# Patient Record
Sex: FEMALE | Race: BLACK OR AFRICAN AMERICAN | NOT HISPANIC OR LATINO | URBAN - METROPOLITAN AREA
[De-identification: names, ages, dates, MRNs, and addresses within clinical notes are randomized per-mention and may not be internally consistent; named-entity substitution may affect disease eponyms.]

---

## 2021-06-24 LAB — RUBV IGG SERPL IA-ACNC: NORMAL

## 2022-11-28 LAB
C TRACH RRNA SPEC DONR QL NAA+PROBE: NORMAL
EXTERNAL ABO GROUPING: NORMAL
EXTERNAL HEMATOCRIT: 35 %
EXTERNAL HEMOGLOBIN: 11.9 G/DL
EXTERNAL HEPATITIS B SURFACE ANTIGEN: NEGATIVE
EXTERNAL PLATELET COUNT: 294 K/ΜL
EXTERNAL RH FACTOR: POSITIVE
EXTERNAL SYPHILIS RPR SCREEN: NORMAL
HCV AB S/CO SERPL IA: NEGATIVE
HEMOGLOBIN FRACTIONATION: NORMAL
N GONORRHOEA DNA SPEC QL NAA+PROBE: NORMAL
VZV IGG SER QL: NORMAL

## 2022-12-13 ENCOUNTER — INITIAL PRENATAL (OUTPATIENT)
Dept: OBSTETRICS AND GYNECOLOGY | Facility: CLINIC | Age: 32
End: 2022-12-13

## 2022-12-13 VITALS — DIASTOLIC BLOOD PRESSURE: 90 MMHG | WEIGHT: 177.6 LBS | SYSTOLIC BLOOD PRESSURE: 135 MMHG

## 2022-12-13 DIAGNOSIS — Z13.0 SCREENING FOR SICKLE-CELL DISEASE OR TRAIT: ICD-10-CM

## 2022-12-13 DIAGNOSIS — O09.291 HISTORY OF STILLBIRTH IN CURRENTLY PREGNANT PATIENT, FIRST TRIMESTER: ICD-10-CM

## 2022-12-13 DIAGNOSIS — N91.2 ABSENT MENSES: Primary | ICD-10-CM

## 2022-12-13 DIAGNOSIS — Z11.3 SCREEN FOR STD (SEXUALLY TRANSMITTED DISEASE): ICD-10-CM

## 2022-12-13 DIAGNOSIS — O34.219 PREVIOUS CESAREAN DELIVERY AFFECTING PREGNANCY, ANTEPARTUM: ICD-10-CM

## 2022-12-13 DIAGNOSIS — O99.331 MATERNAL TOBACCO USE IN FIRST TRIMESTER: ICD-10-CM

## 2022-12-13 DIAGNOSIS — O09.211 HISTORY OF PRETERM LABOR, CURRENT PREGNANCY, FIRST TRIMESTER: ICD-10-CM

## 2022-12-13 DIAGNOSIS — Z13.71 SCREENING FOR GENETIC DISEASE CARRIER STATUS: ICD-10-CM

## 2022-12-13 LAB
B-HCG UR QL: POSITIVE
EXPIRATION DATE: ABNORMAL
GLUCOSE UR STRIP-MCNC: NEGATIVE MG/DL
INTERNAL NEGATIVE CONTROL: ABNORMAL
INTERNAL POSITIVE CONTROL: ABNORMAL
Lab: ABNORMAL
PROT UR STRIP-MCNC: NEGATIVE MG/DL

## 2022-12-13 PROCEDURE — 99204 OFFICE O/P NEW MOD 45 MIN: CPT | Performed by: OBSTETRICS & GYNECOLOGY

## 2022-12-13 PROCEDURE — 99406 BEHAV CHNG SMOKING 3-10 MIN: CPT | Performed by: OBSTETRICS & GYNECOLOGY

## 2022-12-13 PROCEDURE — 81025 URINE PREGNANCY TEST: CPT | Performed by: OBSTETRICS & GYNECOLOGY

## 2022-12-13 RX ORDER — PRENATAL VIT/IRON FUM/FOLIC AC 27MG-0.8MG
1 TABLET ORAL DAILY
Qty: 30 TABLET | Refills: 11 | Status: SHIPPED | OUTPATIENT
Start: 2022-12-13 | End: 2023-02-01 | Stop reason: SDUPTHER

## 2022-12-13 RX ORDER — PRENATAL VIT/IRON FUM/FOLIC AC 27MG-0.8MG
1 TABLET ORAL DAILY
COMMUNITY
Start: 2022-11-28 | End: 2022-12-13 | Stop reason: SDUPTHER

## 2022-12-13 RX ORDER — AMOXICILLIN 250 MG
1 CAPSULE ORAL DAILY
COMMUNITY

## 2022-12-13 NOTE — PROGRESS NOTES
Chief complaint: Absent menses  History of present illness: Patient presents for absent menses.  She reports a certain last menstrual period of 10/24/2022.  She reports that she has had initial prenatal visit at [facility].  She reports ultrasound was done there last week showing a live single intrauterine pregnancy at 6 weeks gestation, given a due date of 2023.  She also reports that she had a Pap smear they are within the last year.  She reports that initial prenatal panel and gonorrhea chlamydia cultures were also performed recently at [facility].  She does not have the results with her today.  Patient reports some mild episode of spotting about 2 weeks ago.  No bleeding since.  She reports mild uterine/pelvic cramping.  She reports resolution of her nausea and vomiting.  She does report some breast tenderness.  Patient has complicated past obstetrical history.  Patient reports that she had no prenatal care in her first pregnancy.  She stated that she had not realized that she was pregnant at the time.  She started having UTI pains and had not had a period for about 3 months.  She went to the hospital and was told she was actually 8 months pregnant and was in labor.  She says she went to the hospital in [place], [place].  She says that she was told that she needed an emergency .  When she woke up, she states that the baby had passed away.  She states an autopsy was not performed, or she is unaware of the results of 1.  She denies any known obvious chromosomal issues with that pregnancy.  This was in 2016.  Patient had another pregnancy last year.  She had 8-week spontaneous  which did not require dilation and curettage.  She was followed by [facility] for that pregnancy.    Past medical history: Significant only for stillbirth    Past surgical history:  section    Family history: Noncontributory for obstetrical complications    Social history: Current everyday smoker,  smokes 2-5 cigarettes/day.  Denies alcohol or drug abuse.    Current medications: Prenatal vitamins, Colace    Allergies: No known drug allergies    ROS:  Constitutional: No fevers, chills, sweats   Eye: No recent visual problems, denies blurry vision   HEENT: No ear pain, nasal congestion, sore throat, voice changes   Respiratory: No shortness of breath, cough, pain on breathing   Cardiovascular: No Chest pain, palpitations   Gastrointestinal: No nausea, vomiting, diarrhea, constipation   Genitourinary: No hematuria, dysuria, lesions on genitalia   Hema/Lymph: Negative for bruising, no edema   Endocrine: Negative for excessive thirst, excessive hunger, heat or cold intolerance   Musculoskeletal: No joint pain, muscle pain, decreased range of motion   Integumentary: No rash, pruritus, abrasions, lesions   Neurologic: No weakness, numbness, headaches   Psychiatric: No anxiety, depression, mood changes     Objective:  Physical exam: General: No acute distress, awake and x3  HEENT: Normocephalic, atraumatic, moist mucous membranes  Eyes: Pupils equal round react light accommodation, extraocular muscles intact  Lungs: Normal respiratory effort, normal work of breathing, good air movement  Abdomen: Soft, nontender, nondistended  Pelvic exam: Deferred today.  Pap smear not due and recent cultures already performed.    Assessment:  1.  32-year-old  3 para 0-1-1-0 at 7 weeks and 1 day by last menstrual period consistent with a 6-week ultrasound  2.  History of prior  birth  3.  History of prior stillbirth  4.  History of prior  section    Plan:  1.  Initial prenatal counseling performed today. Educational handouts on prenatal care provided to patient. Discussed frequency of visits, lab testing, OTC medications, physical activity, exercise, dietary restrictions, potential exposures (COVID, Zika, Toxo, etc). Hospital and call coverage system discussed. Pt is recommended to continue PNV.   Obtain US next  available for dating.  Patient has already had prenatal labs performed and a recent Pap smear done.  We will request records from [facility].  Has never had screening for genetic abnormalities.  We will perform CF/SMA/Fragile X testing today.  2. The risk of tobacco use during pregnancy was discussed with the patient.  The risks of adverse events for both the patient and for her offspring were discussed.  These include but are not limited to fetal growth restriction, placental abruption,  labor, hypertensive disorders of pregnancy such as preeclampsia or gestational hypertension, venous thromboembolism/deep vein thrombosis, stroke, heart attack, cancer, death.  Full cessation from tobacco products was encouraged. Greater than 3 mins was spent on tobacco cessation efforts.    3.  Discussed previous pregnancy.  Obviously will need more information from her hospitalization in [place] in 2016.  We will request the records.  Will need op report from this to know more about route of delivery for this pregnancy.  If patient is a candidate for trial of labor, she seems that she would be interested in a trial of labor after  section.  Need to make sure that for  section was not classical approach.  4.  Recommend return to office in 4 weeks.  Cell free DNA testing at the next visit.    I spent 45 minutes today on the care of this patient, including review of the chart and any pertinent prior applicable imaging and labs, interview/exam of the patient, developing care plan, and patient counseling.

## 2022-12-14 LAB
HGB A MFR BLD ELPH: 97.6 % (ref 96.4–98.8)
HGB A2 MFR BLD ELPH: 2.4 % (ref 1.8–3.2)
HGB F MFR BLD ELPH: 0 % (ref 0–2)
HGB FRACT BLD-IMP: NORMAL
HGB S MFR BLD ELPH: 0 %
HIV 1+2 AB+HIV1 P24 AG SERPL QL IA: NON REACTIVE

## 2022-12-20 LAB
CFTR MUT ANL BLD/T: NORMAL
FMR1 GENE CGG RPT BLD/T QL: NORMAL
LABORATORY COMMENT REPORT: NORMAL
LABORATORY COMMENT REPORT: NORMAL

## 2022-12-21 LAB
CLINICAL GENETICS COUNSELING NOTE: ABNORMAL
CLINICAL INFO: ABNORMAL
ETHNIC BACKGROUND STATED: ABNORMAL
LAB DIRECTOR NAME PROVIDER: ABNORMAL
LABORATORY COMMENT REPORT: ABNORMAL
REASON FOR REFERRAL (NARRATIVE): ABNORMAL
REF LAB TEST METHOD: ABNORMAL
SMN1 GENE MUT ANL BLD/T: ABNORMAL
SPECIMEN SOURCE: ABNORMAL
TEST PERFORMANCE INFO SPEC: ABNORMAL
TEST PERFORMANCE INFO SPEC: ABNORMAL

## 2022-12-22 ENCOUNTER — TELEPHONE (OUTPATIENT)
Dept: OBSTETRICS AND GYNECOLOGY | Facility: CLINIC | Age: 32
End: 2022-12-22

## 2022-12-22 DIAGNOSIS — Z15.89 BIALLELIC MUTATION OF SMN1 GENE: Primary | ICD-10-CM

## 2022-12-22 NOTE — TELEPHONE ENCOUNTER
----- Message from Kayce Weaver RN sent at 12/22/2022  9:07 AM EST -----  Regarding: Lab results   Contact: [phone]  My Chart. 8w3d. OB 1/10/2023. Concern over lab result genetic carrier. I did not see where you have been able to review her testing yet. Thank you.      ----- Message -----  From: Rhonda Wells  Sent: 12/22/2022   8:57 AM EST  To: [facility]  Subject: Lab results                                      Good morning,   I reviewed my most recent lab result and it stated that I am a silent carrier for a genetic issue. I believe it was called spinal muscular atrophy. I read it and now I’m confused. Is this something I should be seriously worried about? I wanted to follow up with you regarding that.     I look forward to hearing from you.   Have a wonderful day!

## 2022-12-22 NOTE — TELEPHONE ENCOUNTER
Spoke with patient at length.  Explained findings of SMA silent carrier.  Discussed recommendations of testing father the baby.  Would recommend genetic counseling.  Genetic counseling referral ordered.  Patient would like to come into office with the father of the baby to discuss and have him tested.  She was added to the schedule next week on 12/27.

## 2022-12-27 ENCOUNTER — ROUTINE PRENATAL (OUTPATIENT)
Dept: OBSTETRICS AND GYNECOLOGY | Facility: CLINIC | Age: 32
End: 2022-12-27

## 2022-12-27 VITALS — SYSTOLIC BLOOD PRESSURE: 129 MMHG | WEIGHT: 179.6 LBS | DIASTOLIC BLOOD PRESSURE: 89 MMHG

## 2022-12-27 DIAGNOSIS — Z14.8 CARRIER OF SPINAL MUSCULAR ATROPHY: Primary | ICD-10-CM

## 2022-12-27 LAB
GLUCOSE UR STRIP-MCNC: NEGATIVE MG/DL
PROT UR STRIP-MCNC: NEGATIVE MG/DL

## 2022-12-27 PROCEDURE — 99213 OFFICE O/P EST LOW 20 MIN: CPT | Performed by: OBSTETRICS & GYNECOLOGY

## 2022-12-27 NOTE — PROGRESS NOTES
Chief complaint: Follow-up abnormal SMN1 results  HPI: Patient here to discuss recent abnormal lab results.  Patient screened as a carrier for SMA.  She is without complaints at this time.  She is accompanied by her partner.  She desires to have her partner screened for SMA.  Of note, we still have not received outside prenatal records from [facility] and we have not received her delivery records from [facility].    Objective: See vital signs in the flowsheet  General: No acute distress, awake alert x3    Labs: 2 copis SMN1 - silent carrier; CF negative; Fragile X negative  Sickle cell screen negative  HIV negative  Assessment:  1.  32-year-old  3 para 0-1-1-0 at 9-1/7 weeks gestational age  2.  Silent carrier for SMA  3.  History of IUFD/ birth in G1  4.  History of prior   5.  Suspected undiagnosed chronic hypertension    Plan:  1.  Discussed significance of SMA carrier state at length.  Patient has been referred for genetic counseling.  Patient's partner here today and he wishes to be tested to see if he is a carrier for SMA.  Partner's name is Graham Duarte.  2.  Otherwise doing well.  Plan to return as previously scheduled in 2 weeks.  Cell free DNA testing at that visit.  If we have still not obtained her previously collected prenatal labs from [facility], I would repeat her OB profile at that time.  3. I spent 20 minutes today on the care of this patient, including review of the chart and any pertinent prior imaging and labs, interview/exam of the patient, developing care plan, and counseling the patient on management options.

## 2023-01-10 ENCOUNTER — ROUTINE PRENATAL (OUTPATIENT)
Dept: OBSTETRICS AND GYNECOLOGY | Facility: CLINIC | Age: 33
End: 2023-01-10
Payer: COMMERCIAL

## 2023-01-10 VITALS — WEIGHT: 180 LBS | DIASTOLIC BLOOD PRESSURE: 79 MMHG | SYSTOLIC BLOOD PRESSURE: 128 MMHG

## 2023-01-10 DIAGNOSIS — Z11.3 SCREEN FOR SEXUALLY TRANSMITTED DISEASES: ICD-10-CM

## 2023-01-10 DIAGNOSIS — O09.899 SUPERVISION OF OTHER HIGH RISK PREGNANCIES, UNSPECIFIED TRIMESTER: ICD-10-CM

## 2023-01-10 DIAGNOSIS — Z36.0 ENCOUNTER FOR ANTENATAL SCREENING FOR CHROMOSOMAL ANOMALIES: ICD-10-CM

## 2023-01-10 DIAGNOSIS — O34.219 PREVIOUS CESAREAN DELIVERY AFFECTING PREGNANCY, ANTEPARTUM: ICD-10-CM

## 2023-01-10 DIAGNOSIS — O09.211 HISTORY OF PRETERM LABOR, CURRENT PREGNANCY, FIRST TRIMESTER: Primary | ICD-10-CM

## 2023-01-10 LAB
GLUCOSE UR STRIP-MCNC: NEGATIVE MG/DL
PROT UR STRIP-MCNC: NEGATIVE MG/DL

## 2023-01-10 PROCEDURE — 99214 OFFICE O/P EST MOD 30 MIN: CPT | Performed by: OBSTETRICS & GYNECOLOGY

## 2023-01-10 NOTE — PROGRESS NOTES
Chief complaint: Prenatal visit  History of present illness: Patient is here for routine prenatal visit.  She has no major complaints today.  She reports some occasional mild cramping at times.  No bleeding.    At the time of her last visit, her partner was screened for SMA and carrier status as the patient is an SMA carrier.  His screen was negative.  Objective: See vital signs in the flowsheet  Fetal heart tones: 170  Assessment:  1.  32-year-old  3 para 0-1-1-0 at 11-1/7 weeks gestational age  2.  History of  delivery  3.  History of IUFD  4.  History of previous , unknown scar  5.  SM N1 carrier    I have obtained the patient's prior prenatal records from [facility].  I have reviewed these records at length.  The only thing significant was positive red blood cell antibody which was not identified.  We would repeat today.  Blood type is O+    Plan:  1.  Counseled regarding chromosomal screening.  Risk, benefits, alternatives, and side effects discussed.  She opts for NIPT today.  2.  We will need to obtain delivery records from her last  to better know how to  patient regarding options of a trial of labor versus repeat .  We will send another request to the [facility] in [place] to obtain these.  3.  Return to the office in 4 weeks.  I spent 30 minutes today on the care of this patient, including review of the chart and any pertinent prior imaging and labs, interview/exam of the patient, developing care plan, and counseling the patient on management options.

## 2023-01-11 LAB — BLD GP AB SCN SERPL QL: NEGATIVE

## 2023-01-14 LAB
CFDNA.FET/CFDNA.TOTAL SFR FETUS: NORMAL %
CITATION REF LAB TEST: NORMAL
FET 13+18+21+X+Y ANEUP PLAS.CFDNA: NEGATIVE
FET CHR 21 TS PLAS.CFDNA QL: NEGATIVE
FET SEX PLAS.CFDNA DOSAGE CFDNA: NORMAL
FET TS 13 RISK PLAS.CFDNA QL: NEGATIVE
FET TS 18 RISK WBC.DNA+CFDNA QL: NEGATIVE
GA EST FROM CONCEPTION DATE: NORMAL D
GESTATIONAL AGE > 9:: YES
LAB DIRECTOR NAME PROVIDER: NORMAL
LAB DIRECTOR NAME PROVIDER: NORMAL
LABORATORY COMMENT REPORT: NORMAL
LIMITATIONS OF THE TEST: NORMAL
NEGATIVE PREDICTIVE VALUE: NORMAL
NOTE: NORMAL
PERFORMANCE CHARACTERISTICS: NORMAL
POSITIVE PREDICTIVE VALUE: NORMAL
REF LAB TEST METHOD: NORMAL
TEST PERFORMANCE INFO SPEC: NORMAL

## 2023-01-16 ENCOUNTER — TELEPHONE (OUTPATIENT)
Dept: OBSTETRICS AND GYNECOLOGY | Facility: CLINIC | Age: 33
End: 2023-01-16
Payer: COMMERCIAL

## 2023-01-16 NOTE — TELEPHONE ENCOUNTER
Provider: CHUY GOEL MD  Caller: AUGIE MCGUIRE  Relationship to Patient: SELF  Phone Number: [phone]  Reason for Call: RESULTS FOR [id number] TEST.    PT RECEIVED RESULTS VIA PHONE AND IS REQUESTING THAT RESULTS BE RELEASED ON [employer] SO THAT SHE CAN PRINT IT.

## 2023-01-16 NOTE — TELEPHONE ENCOUNTER
Patient notified of [id number] results-normal per Dr. Vance. Patient did want to know sex of baby and told was a girl. Verified x2 with patient that she did want gender results.

## 2023-01-30 ENCOUNTER — TELEPHONE (OUTPATIENT)
Dept: GENETICS | Facility: HOSPITAL | Age: 33
End: 2023-01-30
Payer: COMMERCIAL

## 2023-01-31 ENCOUNTER — CLINICAL SUPPORT (OUTPATIENT)
Dept: GENETICS | Facility: HOSPITAL | Age: 33
End: 2023-01-31
Payer: COMMERCIAL

## 2023-01-31 ENCOUNTER — TELEPHONE (OUTPATIENT)
Dept: OBSTETRICS AND GYNECOLOGY | Facility: CLINIC | Age: 33
End: 2023-01-31
Payer: COMMERCIAL

## 2023-01-31 DIAGNOSIS — Z14.8 CARRIER OF SPINAL MUSCULAR ATROPHY: ICD-10-CM

## 2023-01-31 DIAGNOSIS — Z13.79 GENETIC TESTING: Primary | ICD-10-CM

## 2023-01-31 DIAGNOSIS — O09.291 HISTORY OF STILLBIRTH IN CURRENTLY PREGNANT PATIENT, FIRST TRIMESTER: ICD-10-CM

## 2023-01-31 NOTE — PROGRESS NOTES
Rhonda Wells is a 32-year-old  female seen for genetic counseling to discuss her spinal muscular atrophy (SMA) carrier status. Genetic counseling was performed via telephone. Ms. Wells confirmed her full name, date of birth, and that she was physically located in the [facility] at the time of the appointment. Ms. Wells was interested in discussing her carrier screening results and the odds of having a child affected with SMA.     FAMILY HISTORY:  We reviewed Ms. Wells and her partner’s family histories in detail. Ms. Wells is currently pregnant. She reports two prior miscarriages. Ms. Wells reports her partner is a carrier for sickle cell trait and has an autoimmune disease related to HIV. Ms. Wells reports her maternal uncle has diabetes. Their family histories are otherwise negative for known genetic conditions, multiple miscarriages, or birth defects.      GENETIC COUNSELING: We began by reviewing the 3-5% background risk to have a baby with a birth defect and carrier screening is available for over 550 recessive and X-linked conditions. Individuals who are carriers for these disorders typically do not have a family history of these disorders, as the majority of the conditions on the panel are inherited in an autosomal recessive pattern; other conditions on the panel are inherited in an X-linked manner. Most people are carriers for several genetic diseases, but these carriers often do not exhibit any symptoms of the disease. If a couple both carry the same disease, there is a 1 in 4 (25%) chance that they will have a child affected with the disease. The diseases on the panel range in severity from mild to severe.    Given Ms. Wells’s carrier status for SMA, we reviewed SMA. SMA is characterized by progressive muscle weakness, resulting in complications such as poor weight gain, sleep difficulties, pneumonia, scoliosis, and joint contractures. Onset of this condition is typically prior to the  age of 6 months, and children typically have a lifespan of less than two years. SMA is inherited in an autosomal recessive manner, which means that if both parents are SMA carriers there is a 25% (1 in 4) chance of having a child with the condition. The carrier frequency for SMA in the  population is 1 in 35 to 1 in 50. Since Ms. Wells is a known carrier of SMA, testing of the father of the baby would be most informative to determine the chance of having a child with SMA. Of note, Ms. Wells has had carrier screening for Fragile X, cystic fibrosis (CF), and a hemoglobinopathy fractionation cascade that were negative.      GENETIC TESTING:  The risks, benefits and limitations of genetic testing were reviewed. Genetic testing can have significant psychological implications for both individuals and families. Also discussed was the possibility of employment and insurance discrimination based on genetic test results and the federal and states laws that are in place to prevent this (KELLY), as well as limitations of these laws.     TEST RESULTS:  Carrier screening identified Ms. Wells to be positive for the c.*3+80T>G SNP, indicating a high risk that she is a carrier for SMA despite having two copies of SMN1. Her risk of being a silent carrier for SMA is estimated to be 1/34 based on the presence of this SNP.      Ms. Wells’s partner, Mr. Duarte, was not a carrier for SMA.     SMA is inherited in an autosomal recessive manner, meaning that both individuals have to be carriers to be at risk of having a child with the condition.  Since they were not found to be a carrier for the same condition, the risk of having a child affected with one of these conditions is low.  Residual risks are reported to account for the possibility of an undetectable mutation, with the residual risk of Mr. Duarte being a carrier for SMA is estimated to be 1/375.    The risk for the couple to have a child with one of the other  disorders on the panel is exceedingly low. Each of their children will have a 1 in 2 (50%) chance to be a carrier for each of the conditions listed above, and while carriers do not show symptoms of the disease, this information may be helpful to them in the future in terms of reproductive planning.  These assessments are based on the information provided at time of consult and could change should new information become available regarding their personal and/or family history.     PLAN:  Genetic counseling remains available to Ms. Wells and she are encouraged to contact us at [phone] with any questions she may have.    Brianna Domínguez MS, [facility] [place], LGC     Licensed Certified Genetic Counselor    Cc: Rhonda Vance MD

## 2023-02-01 RX ORDER — PRENATAL VIT/IRON FUM/FOLIC AC 27MG-0.8MG
1 TABLET ORAL DAILY
Qty: 30 TABLET | Refills: 11 | Status: SHIPPED | OUTPATIENT
Start: 2023-02-01

## 2023-02-01 NOTE — TELEPHONE ENCOUNTER
Refill of prenatal vitamins sent to [facility] per Dr Rajiv graham.   Left message on her phone that refill sent with 11 refills to her [facility] and also My Chart message sent. Thank you.

## 2023-02-06 ENCOUNTER — ROUTINE PRENATAL (OUTPATIENT)
Dept: OBSTETRICS AND GYNECOLOGY | Facility: CLINIC | Age: 33
End: 2023-02-06
Payer: COMMERCIAL

## 2023-02-06 VITALS — SYSTOLIC BLOOD PRESSURE: 130 MMHG | DIASTOLIC BLOOD PRESSURE: 82 MMHG | WEIGHT: 186.4 LBS

## 2023-02-06 DIAGNOSIS — O09.892 HISTORY OF PRETERM DELIVERY, CURRENTLY PREGNANT IN SECOND TRIMESTER: ICD-10-CM

## 2023-02-06 DIAGNOSIS — O09.292 HISTORY OF STILLBIRTH IN CURRENTLY PREGNANT PATIENT, SECOND TRIMESTER: ICD-10-CM

## 2023-02-06 DIAGNOSIS — O09.899 SUPERVISION OF OTHER HIGH RISK PREGNANCIES, UNSPECIFIED TRIMESTER: ICD-10-CM

## 2023-02-06 DIAGNOSIS — N90.89 VULVAR LESION: ICD-10-CM

## 2023-02-06 DIAGNOSIS — O34.219 PREVIOUS CESAREAN DELIVERY AFFECTING PREGNANCY, ANTEPARTUM: Primary | ICD-10-CM

## 2023-02-06 PROCEDURE — 99214 OFFICE O/P EST MOD 30 MIN: CPT | Performed by: OBSTETRICS & GYNECOLOGY

## 2023-02-06 RX ORDER — ASPIRIN 81 MG/1
81 TABLET ORAL DAILY
Qty: 30 TABLET | Refills: 6 | Status: SHIPPED | OUTPATIENT
Start: 2023-02-06 | End: 2023-03-08

## 2023-02-06 RX ORDER — VALACYCLOVIR HYDROCHLORIDE 1 G/1
1000 TABLET, FILM COATED ORAL 2 TIMES DAILY
Qty: 6 TABLET | Refills: 3 | Status: SHIPPED | OUTPATIENT
Start: 2023-02-06 | End: 2023-02-09

## 2023-02-06 NOTE — PROGRESS NOTES
Chief complaint: Prenatal visit  History of present illness: Patient is here for her routine prenatal visit.  She reports concerns of a possible yeast infection today.  She reports itching and irritation of the vulva externally, more in the left side.  She has used an over-the-counter Monistat kit about 3 days ago with no significant improvement in her symptoms.  She denies vaginal discharge.  She denies bleeding.  She has not noticed fetal movement yet.  Patient has questions today about expectations for the second trimester, timing of delivery, timing of ultrasound, etc.    Objective: See vital signs in the flowsheet  General: No acute distress, awake and oriented x3  Abdomen: Soft, nontender, gravid, fetal heart tones 160  Pelvic exam (performed in the presence of a female chaperone).  Patient has given verbal consent to proceed with the exam.  External genitalia: Normal external female genitalia: There is no erythema, there is some mild edema of the left labia minora and majora.  There are about 3-4 vesicular type lesions noted on the left vulva and just onto the buttock on the left side which appear to be consistent with herpetic lesions.  These are extremely tender to touch with a swab.  Cultures were sent.  There is no significant vaginal discharge  Speculum exam: Deferred    External records reviewed:  I reviewed the patient's delivery records from [facility] from the last pregnancy.  She had had no prenatal care for that pregnancy.  She came in in active labor at unknown gestation estimated to be about 35 weeks.  Apparently upon arrival to the hospital there was a significant fetal bradycardia down to about 60 noted.  She was taken for emergent  delivery.  At the time of delivery, baby had Apgar scores of 0 at 1 minute and 0 at 5 minutes.  The fetus weighed 6 pounds 6 ounces.  Diagnosis from the delivery note was a severe intrauterine infection.  Patient was started on triple antibiotic  therapy and continued throughout her hospital stay.  She ultimately went home on day 4.  Delivery note states that there was a vertical extension of the incision to deliver the baby and they do not recommend a trial of labor.    Assessment:  1.  32-year-old  3 para 0-1-1-0 at 15-0/7 weeks gestational age  2.  History of previous , vertical extension, trial of labor not recommended by previous OB/GYN  3.  History of  demise last pregnancy  4.  History of possible  delivery -patient had no prenatal care and was estimated to be about 35 weeks.  The baby weighed 6 pounds 6 ounces  5.  Vulvar lesions today suspicious for herpes  6.  Suspected chronic hypertension, previously undiagnosed, blood pressures in the prehypertensive range    Plan:  1.  Regarding the patient's bulbar symptoms today, I suspect this is related to herpes outbreak. Exam findings today consistent with genital herpes. Cultures performed for confirmation. Findings discussed with pt. Discussed disease process. Discussed transmission. Discussed potential effects on this and future pregnancies.  Patient will have scheduled  delivery due to her previous history of a vertical extension of her uterine incision.  Safe sex practices encouraged. Albert start Valtrex empirically today. Will send in refills for episodic therapy. If recurrent episodes more than 4 more years, patient may wish for daily suppression.  2.  I discussed my review of the outside delivery records with the patient.  Explained the recommendation to not have a trial of labor.  As such, we will treat this patient as someone who has had a previous classical  with delivery indicated at 36-37 weeks gestation.  Patient verbalized understanding and agreement with plan  3.  Overall suspect undiagnosed chronic hypertension.  Blood pressure is in the prehypertensive range and does not require antihypertensive therapy today.  I would start her on aspirin  next week for prevention of superimposed preeclampsia.  Continue to monitor blood pressure closely  4.  Discussed expectations for the second trimester.  Discussed timing and indications for ultrasound.  Would recommend ultrasound at the next visit for anatomy.  Discussed patient's previous labs including NIPT testing results.  I spent 35 minutes today on the care of this patient, including review of the chart and any pertinent prior imaging and labs, interview/exam of the patient, developing care plan, and counseling the patient on management options and excluding any time spent on other separate billable services such as performing procedures or test interpretation, if applicable.

## 2023-02-08 ENCOUNTER — TELEPHONE (OUTPATIENT)
Dept: OBSTETRICS AND GYNECOLOGY | Facility: CLINIC | Age: 33
End: 2023-02-08
Payer: COMMERCIAL

## 2023-02-08 DIAGNOSIS — N76.2 ACUTE VULVITIS: Primary | ICD-10-CM

## 2023-02-08 RX ORDER — CLOTRIMAZOLE 1 G/ML
SOLUTION TOPICAL 2 TIMES DAILY
Qty: 15 ML | Refills: 1 | Status: SHIPPED | OUTPATIENT
Start: 2023-02-08 | End: 2023-02-15

## 2023-02-08 NOTE — TELEPHONE ENCOUNTER
I spoke with the patient.  I explained again that I feel her symptoms are related to the herpes outbreak.  Confirm that she has started the Valtrex and I encouraged her to continue taking this.  Cultures have not resulted yet, so I cannot confirm herpes versus Candida.  In the event that this is not related to herpes and is consistent with a candidal infection, it is reasonable to start a topical antifungal.  I will send in a prescription for clotrimazole.  Patient verbalized understanding.

## 2023-02-08 NOTE — TELEPHONE ENCOUNTER
OB Patient is requesting something to be sent in. Patient is experiencing vaginal itching, Patient has tried over the counter medication to help but she says that is not working. She is wondering if something could be sent? Patient agrees to be seen if need be .    Please advise,  Thank you

## 2023-02-12 LAB
C ALBICANS DNA VAG QL NAA+PROBE: NEGATIVE
C GLABRATA DNA VAG QL NAA+PROBE: NEGATIVE
C KRUSEI DNA VAG QL NAA+PROBE: NEGATIVE
C LUSITANIAE DNA VAG QL NAA+PROBE: NEGATIVE
CANDIDA DNA VAG QL NAA+PROBE: NEGATIVE

## 2023-02-17 LAB
HSV1 DNA SPEC QL NAA+PROBE: POSITIVE
HSV2 DNA SPEC QL NAA+PROBE: POSITIVE

## 2023-02-20 ENCOUNTER — TELEPHONE (OUTPATIENT)
Dept: OBSTETRICS AND GYNECOLOGY | Facility: CLINIC | Age: 33
End: 2023-02-20
Payer: COMMERCIAL

## 2023-02-20 RX ORDER — VALACYCLOVIR HYDROCHLORIDE 500 MG/1
500 TABLET, FILM COATED ORAL 2 TIMES DAILY
Qty: 60 TABLET | Refills: 4 | Status: SHIPPED | OUTPATIENT
Start: 2023-02-20

## 2023-02-20 NOTE — TELEPHONE ENCOUNTER
I called Rhonda Wells to review culture results. She continues to have some itching, denies current lesions. Reviewed testing was positive for HSV 1 & 2.  She desires further testing (blood work) to confirm this. Advised to discuss with Dr. Vance at her next visit. Low suspicion for false positive at this time. Reviewed negative yeast panel. She desires to begin suppressive therapy with valtrex at this time.     Sintia Garrido, APRN  2/20/23  8:58am

## 2023-02-24 PROBLEM — O98.519 HERPES VIRUS INFECTION IN MOTHER DURING PREGNANCY, ANTEPARTUM: Status: ACTIVE | Noted: 2023-02-24

## 2023-02-24 PROBLEM — B00.9 HERPES VIRUS INFECTION IN MOTHER DURING PREGNANCY, ANTEPARTUM: Status: ACTIVE | Noted: 2023-02-24

## 2023-02-24 PROBLEM — A60.00 GENITAL HERPES: Status: ACTIVE | Noted: 2023-02-24

## 2023-03-06 ENCOUNTER — ROUTINE PRENATAL (OUTPATIENT)
Dept: OBSTETRICS AND GYNECOLOGY | Facility: CLINIC | Age: 33
End: 2023-03-06
Payer: COMMERCIAL

## 2023-03-06 VITALS — DIASTOLIC BLOOD PRESSURE: 90 MMHG | WEIGHT: 187 LBS | SYSTOLIC BLOOD PRESSURE: 136 MMHG

## 2023-03-06 DIAGNOSIS — O09.892 HISTORY OF PRETERM DELIVERY, CURRENTLY PREGNANT IN SECOND TRIMESTER: ICD-10-CM

## 2023-03-06 DIAGNOSIS — Z98.891 HISTORY OF CESAREAN SECTION, CLASSICAL: Primary | ICD-10-CM

## 2023-03-06 DIAGNOSIS — O10.013 PRE-EXISTING ESSENTIAL HYPERTENSION COMPLICATING PREGNANCY, THIRD TRIMESTER: ICD-10-CM

## 2023-03-06 DIAGNOSIS — O09.292 HISTORY OF STILLBIRTH IN CURRENTLY PREGNANT PATIENT, SECOND TRIMESTER: ICD-10-CM

## 2023-03-06 DIAGNOSIS — O10.919 PRE-EXISTING HYPERTENSION AFFECTING PREGNANCY, ANTEPARTUM: ICD-10-CM

## 2023-03-06 DIAGNOSIS — O34.219 PREVIOUS CESAREAN DELIVERY AFFECTING PREGNANCY, ANTEPARTUM: ICD-10-CM

## 2023-03-06 LAB
GLUCOSE UR STRIP-MCNC: NEGATIVE MG/DL
PROT UR STRIP-MCNC: ABNORMAL MG/DL

## 2023-03-06 PROCEDURE — 99214 OFFICE O/P EST MOD 30 MIN: CPT | Performed by: OBSTETRICS & GYNECOLOGY

## 2023-03-06 NOTE — PROGRESS NOTES
Chief complaint: Prenatal visit  History of present illness: Patient is here for her routine prenatal visit.  She reports occasionally feeling some mild pains in the bilateral lower quadrants.  She notes this especially when she goes to stand up or when she is walking.  She reports it feels like tugging or pressure.  She is otherwise doing well.  Good fetal movement.  She notes significant improvement in her vulvar symptoms from the last visit.  She is currently taking Valtrex as prescribed after the cultures returned.  Objective: See vital signs in the flowsheet  General: No acute distress, awake and oriented x3  Abdomen: Soft, gravid, nontender, fetal heart tones 161  Extremities: No lower extremity edema, no tenderness  Ultrasound today: Normal anatomic survey, otherwise normal findings  Assessment:  1.  32-year-old  3 para 0-1-1-0 at 19-0/7 weeks gestational age  2.  History of previous  with vertical incision, labor is not recommended  3.  History of  delivery  4.  History of stillbirth  5.  Chronic hypertension  6.  Herpes outbreak in pregnancy, now resolved  7.SMN1 carrier  Plan:  1.  Patient had questions again today regarding timing of delivery.  Explained recommendation for delivery likely at 36-37 weeks given history of prior   section with a vertical uterine incision.  Ultimately, we will send referral to maternal-fetal medicine at 28 weeks for recommendations on timing of delivery  2.  Patient now with diagnostic criteria for chronic hypertension.  She has had multiple episodes of diastolic blood pressure readings at 90 or greater.  Currently does not require antihypertensive medications.  Patient has started daily low-dose aspirin for prevention of preeclampsia.  We discussed this at length  3.  We discussed issues related to recent herpes diagnosis.  Her symptoms are currently resolved.  She states that she is currently taking Valtrex.  We discussed the use of  daily Valtrex versus intermittently for flareups.  I would recommend starting Valtrex at about 32 weeks for prevention of active outbreak in the third trimester.  4.  Plan maternal-fetal medicine referral at 28 weeks  5-year return to the office in 4 weeks.  Repeat ultrasound for cervical length in 4 weeks.  Normal cervical length today.  I spent 30 minutes today on the care of this patient, including review of the chart and any pertinent prior imaging and labs, interview/exam of the patient, developing care plan, and counseling the patient on management options and excluding any time spent on other separate billable services such as performing procedures or test interpretation, if applicable.

## 2023-03-07 ENCOUNTER — HOSPITAL ENCOUNTER (EMERGENCY)
Facility: HOSPITAL | Age: 33
Discharge: HOME | End: 2023-03-07
Attending: OBSTETRICS & GYNECOLOGY | Admitting: OBSTETRICS & GYNECOLOGY
Payer: COMMERCIAL

## 2023-03-07 ENCOUNTER — TELEPHONE (OUTPATIENT)
Dept: OBSTETRICS AND GYNECOLOGY | Facility: CLINIC | Age: 33
End: 2023-03-07
Payer: COMMERCIAL

## 2023-03-07 VITALS
HEIGHT: 61 IN | BODY MASS INDEX: 36.02 KG/M2 | HEART RATE: 95 BPM | DIASTOLIC BLOOD PRESSURE: 95 MMHG | SYSTOLIC BLOOD PRESSURE: 116 MMHG | RESPIRATION RATE: 16 BRPM | WEIGHT: 190.8 LBS | TEMPERATURE: 98 F

## 2023-03-07 LAB
BILIRUB UR QL STRIP: NEGATIVE
CLARITY UR: CLEAR
COLOR UR: YELLOW
GLUCOSE UR STRIP-MCNC: NEGATIVE MG/DL
HGB UR QL STRIP.AUTO: NEGATIVE
KETONES UR QL STRIP: NEGATIVE
LEUKOCYTE ESTERASE UR QL STRIP.AUTO: NEGATIVE
NITRITE UR QL STRIP: NEGATIVE
PH UR STRIP.AUTO: 6.5 [PH] (ref 5–8)
PROT UR QL STRIP: NEGATIVE
SP GR UR STRIP: 1.02 (ref 1–1.03)
UROBILINOGEN UR QL STRIP: NORMAL

## 2023-03-07 PROCEDURE — 81003 URINALYSIS AUTO W/O SCOPE: CPT | Performed by: OBSTETRICS & GYNECOLOGY

## 2023-03-07 PROCEDURE — 87086 URINE CULTURE/COLONY COUNT: CPT | Performed by: OBSTETRICS & GYNECOLOGY

## 2023-03-07 PROCEDURE — 99283 EMERGENCY DEPT VISIT LOW MDM: CPT | Performed by: OBSTETRICS & GYNECOLOGY

## 2023-03-07 NOTE — TELEPHONE ENCOUNTER
Patient has history of Stillbirth and Miscarriage. She is having complaints of lower back pain, She feels pressure and has cramping when she is sitting , Her pain on a scale of 1-10 is about a 6.    Does provider have any advice for patient ?        Please advise,  Thank you

## 2023-03-07 NOTE — TELEPHONE ENCOUNTER
Please advise the patient to go to labor and delivery at [facility] for evaluation.  Please send a confirmation to me after you have spoken to the patient.

## 2023-03-07 NOTE — OBED NOTES
[facility] Note OB    Patient Name: Rhonda Wells  YOB: 1990  MRN: [id number]  Admission Date: 3/7/2023  1:45 PM  Date of Service: 3/7/2023    Chief Complaint: abdominal pain and pressure      Subjective     Rhonda Wells is a 32 y.o. female  at 19w1d with Estimated Date of Delivery: 23 who presents with the chief complaint listed above. Pt reports for the last week and especially today she has noticed bilateral groin pain that worsens with position changes like standing up . She has a poor OB hx of 8 month pregnancy ending with stat CS and fetal demise and reports feels like she felt back then and wants to make sure the baby is OK. She had a normal anatomy scan of baby yesterday.     She sees Ayan Vance for her prenatal care. Her pregnancy has been complicated by:  Prior classical CS with fetal demise of 6 lb infant, tobacco, CHTN, HSV, obese    She describes fetal movement as normal.  She denies rupture of membranes.  She denies vaginal bleeding. She is not feeling contractions.        Objective   Patient Active Problem List    Diagnosis    • History of  section, classical [Z98.891]    • Pre-existing hypertension affecting pregnancy, antepartum [O10.919]    • Herpes virus infection in mother during pregnancy, antepartum [O98.519, B00.9]    • Genital herpes [A60.00]    • History of  delivery, currently pregnant in second trimester [O09.892]    • History of stillbirth in currently pregnant patient, second trimester [O09.292]    • Supervision of other high risk pregnancies, unspecified trimester [O09.899]    • Carrier of spinal muscular atrophy [Z14.8]    • History of stillbirth in currently pregnant patient, first trimester [O09.291]    • Previous  delivery affecting pregnancy, antepartum [O34.219]    • History of  labor, current pregnancy, first trimester [O09.211]         OB History    Para Term  AB Living   3 1 0 1 1 0    SAB IAB Ectopic Molar Multiple Live Births   1 0 0 0 0 0      # Outcome Date GA Lbr Waqar/2nd Weight Sex Delivery Anes PTL Lv   3 Current            2 SAB  8w0d          1   32w0d   F CS-Unspec   FD        Past Medical History:   Diagnosis Date   • Stillbirth     Pt emergency Csection at 8 months. Reports when she woke up he baby had passed away.       Past Surgical History:   Procedure Laterality Date   •  SECTION         No current facility-administered medications on file prior to encounter.     Current Outpatient Medications on File Prior to Encounter   Medication Sig Dispense Refill   • aspirin 81 MG EC tablet Take 1 tablet by mouth Daily for 30 days. 30 tablet 6   • Prenatal Vit-Fe Fumarate-FA (prenatal vitamin 27-0.8) 27-0.8 MG tablet tablet Take 1 tablet by mouth Daily. 30 tablet 11   • sennosides-docusate (PERICOLACE) 8.6-50 MG per tablet Take 1 tablet by mouth Daily.     • valACYclovir (Valtrex) 500 MG tablet Take 1 tablet by mouth 2 (Two) Times a Day. 60 tablet 4       No Known Allergies    Family History   Problem Relation Age of Onset   • Diabetes Maternal Aunt    • Diabetes Maternal Grandmother        Social History     Socioeconomic History   • Marital status: Single   Tobacco Use   • Smoking status: Every Day     Packs/day: 0.25     Types: Cigarettes   Vaping Use   • Vaping Use: Never used   Substance and Sexual Activity   • Alcohol use: Not Currently   • Drug use: Never   • Sexual activity: Yes     Partners: Male           Review of Systems   Constitutional: Negative for chills and fever.   HENT: Negative.    Eyes: Negative for photophobia and visual disturbance.   Respiratory: Negative for shortness of breath.    Cardiovascular: Negative for chest pain.   Gastrointestinal: Positive for abdominal pain. Negative for nausea.   Psychiatric/Behavioral: The patient is not nervous/anxious.           PHYSICAL EXAM:      VITAL SIGNS:  There were no vitals filed for this visit.         FHT'S:     FHTs pos                                     PHYSICAL EXAM:      General: well developed; well nourished  no acute distress   Heart: Not performed.   Lungs   breathing is unlabored   Abdomen: Gravid and non tender     Extremities: trace edema, DTRs 1 plus, no clonus       Cervix: Per RN        Contractions:   none                    LABS AND TESTING ORDERED:  1. Uterine and fetal monitoring  2. Urinalysis      LAB RESULTS:    No results found for this or any previous visit (from the past 24 hour(s)).    Lab Results   Component Value Date    ABO O 11/28/2022    RH Positive 11/28/2022       No results found for: STREPGPB              External Prenatal Results     Pregnancy Outside Results - Transcribed From Office Records - See Scanned Records For Details     Test Value Date Time    ABO ^ O  11/28/22     Rh ^ Positive  11/28/22     Antibody Screen  Negative  01/10/23 0948    Varicella IgG ^ iMMUNE  11/28/22     Rubella ^ Immune  06/24/21     Hgb ^ 11.9 g/dL 11/28/22     Hct ^ 35 % 11/28/22     Glucose Fasting GTT       Glucose Tolerance Test 1 hour       Glucose Tolerance Test 3 hour       Gonorrhea (discrete) ^ NEG  11/28/22     Chlamydia (discrete) ^ NEG  11/28/22     RPR ^ Non-Reactive  11/28/22     VDRL       Syphilis Antibody       HBsAg ^ Negative  11/28/22     Herpes Simplex Virus PCR       Herpes Simplex VIrus Culture       HIV  Non Reactive  12/13/22 1539    Hep C RNA Quant PCR       Hep C Antibody ^ Negative  11/28/22     AFP       Group B Strep       GBS Susceptibility to Clindamycin       GBS Susceptibility to Erythromycin       Fetal Fibronectin       Genetic Testing, Maternal Blood             Drug Screening     Test Value Date Time    Urine Drug Screen       Amphetamine Screen       Barbiturate Screen       Benzodiazepine Screen       Methadone Screen       Phencyclidine Screen       Opiates Screen       THC Screen       Cocaine Screen       Propoxyphene Screen       Buprenorphine Screen        Methamphetamine Screen       Oxycodone Screen       Tricyclic Antidepressants Screen             Legend    ^: Historical                          Impression:   @ 19w1d .  Final Diagnosis: probable round ligament pain, poor OB history     Plan:  1. Discharge to home.    2. Plan of care has been reviewed with patient along with risks, benefits of treatment.   All questions have been answered. Call health care provider for any further concerns and keep office appointments.  3. Comfort measures, miscarriage precautions      I discussed the patients findings and my recommendations with patient, family and nursing staff      Briseida Suazo MD  3/7/2023  14:21 EST

## 2023-03-08 LAB — BACTERIA SPEC AEROBE CULT: NORMAL

## 2023-04-03 ENCOUNTER — ROUTINE PRENATAL (OUTPATIENT)
Dept: OBSTETRICS AND GYNECOLOGY | Facility: CLINIC | Age: 33
End: 2023-04-03
Payer: COMMERCIAL

## 2023-04-03 VITALS — WEIGHT: 193.4 LBS | SYSTOLIC BLOOD PRESSURE: 132 MMHG | BODY MASS INDEX: 36.54 KG/M2 | DIASTOLIC BLOOD PRESSURE: 81 MMHG

## 2023-04-03 DIAGNOSIS — Z98.891 HISTORY OF CESAREAN SECTION, CLASSICAL: ICD-10-CM

## 2023-04-03 DIAGNOSIS — O09.892 HISTORY OF PRETERM DELIVERY, CURRENTLY PREGNANT IN SECOND TRIMESTER: ICD-10-CM

## 2023-04-03 DIAGNOSIS — Z11.3 SCREEN FOR SEXUALLY TRANSMITTED DISEASES: ICD-10-CM

## 2023-04-03 DIAGNOSIS — O10.919 PRE-EXISTING HYPERTENSION AFFECTING PREGNANCY, ANTEPARTUM: Primary | ICD-10-CM

## 2023-04-03 DIAGNOSIS — Z13.1 SCREENING FOR DIABETES MELLITUS: ICD-10-CM

## 2023-04-03 LAB
GLUCOSE UR STRIP-MCNC: NEGATIVE MG/DL
PROT UR STRIP-MCNC: NEGATIVE MG/DL

## 2023-04-03 PROCEDURE — 99214 OFFICE O/P EST MOD 30 MIN: CPT | Performed by: OBSTETRICS & GYNECOLOGY

## 2023-04-03 RX ORDER — ASPIRIN 81 MG/1
1 TABLET, COATED ORAL DAILY
COMMUNITY
Start: 2023-03-08

## 2023-04-03 NOTE — PROGRESS NOTES
Chief complaint: Prenatal visit  History of present illness: Patient is here for routine prenatal visit.  She reports feeling some numbness in her hands bilaterally.  Says she notices it most when she sleeps at night.  Also having occasional tingling and numbness in her feet.  She is reporting swelling in her feet.  She reports good fetal movement.  Denies contractions, vaginal bleeding, leakage of fluid.  Objective: See vital signs in the flowsheet  General: No acute distress, awake and oriented x3  Abdomen soft, nontender, fetal heart tones 157  Extremities: Trace lower extremity edema, no calf tenderness, no signs of DVT  Ultrasound today: Cervical length 4.23 cm without funneling or dynamic changes.  Normal amniotic fluid.  Breech presentation  Assessment:  1.  32-year-old  3 para 0-1-1-0 at 23-0/7 weeks gestational age  2.  History of chronic hypertension, currently well controlled  3.  History of previous classical  section  4.  History of prior  delivery  5.  Herpes in pregnancy, currently no outbreaks  Plan:  1P ultrasound findings discussed with the patient.  Limitations of ultrasound explained.  2.  Plan MFM consultation in about 5 weeks.  Referral sent today.  In addition to standard consultation requesting information and recommendations regarding timing of delivery given previous history of  delivery and prior classical  section/vertical extension.  3.  Return to the office in 4 weeks.  28-week labs at the next visit.  I spent 30 minutes today on the care of this patient, including review of the chart and any pertinent prior imaging and labs, interview/exam of the patient, developing care plan, and counseling the patient on management options and excluding any time spent on other separate billable services such as performing procedures or test interpretation, if applicable.

## 2023-04-20 ENCOUNTER — REFERRAL TRIAGE (OUTPATIENT)
Dept: LABOR AND DELIVERY | Facility: HOSPITAL | Age: 33
End: 2023-04-20
Payer: COMMERCIAL

## 2023-04-24 ENCOUNTER — PATIENT OUTREACH (OUTPATIENT)
Dept: LABOR AND DELIVERY | Facility: HOSPITAL | Age: 33
End: 2023-04-24
Payer: COMMERCIAL

## 2023-04-24 NOTE — OUTREACH NOTE
[facility]  Enrollment    Current Estimated Gestational Age: 26w0d    Questions/Answers    Flowsheet Row Responses   Would like to participate? Yes   Date of Intake Visit 04/24/23        [facility]  Intake    Current Estimated Gestational Age: 26w0d    Intake Assessment    Flowsheet Row Responses   Best Method for Contacting Cell   Currently Employed Yes   Able to keep appointments as scheduled Yes   Gender(s) and Name(s) girl   Do you have a dentist? Yes   Dentist Name [facility]   Next Appointment: --  [june ]   Resources Presently Utilizing: WIC (Women, Infant, Children), Mental Health Services, Case Management   Maternal Warning Signs Provided   Other Education HANDS, Insurance benefits/Incentives, WIC Benefits, How to find a pediatrician          Learning Assessment    Flowsheet Row Responses   Relationship Patient   Learner Name Rhonda   Does the learner have any barriers to learning? No Barriers   What is the preferred language of the learner for medical teaching? English   Is an  required? No   How does the learner prefer to learn new concepts? Listening, Reading, Demonstration, Pictures/Video        Intake completed today. Pt is working full time and reports stable housing and reliable transportation. We discussed her IUFD. She will be meeting with [facility] in early May to get more details about the delivery plan with this pregnancy. She is interested in childbirth classes and a hospital tour, info given. Will get specific info to her about the c/s class after meeting with [facility]. She has a  through hospitals to hospitals and good relationship with her therapist, able to discuss her past birth experience. She is receiving WIC benefits and had all necessary infant supplies. Reviewed how to order a breast pump. Reviewed maternal warning signs. HANDS referral made today. Will plan to f/u after [facility] appt to help with scheduling classes and tours.      Referral submitted to the  following resources (verbal consent received to submit demographic information):     HANDS    Tobacco, Alcohol, and Drug History     reports that she has been smoking cigarettes. She has been smoking an average of .25 packs per day. She does not have any smokeless tobacco history on file.   reports that she does not currently use alcohol.   reports no history of drug use.    Shamika Yen RN  Maternity Nurse Navigator    4/24/2023, 13:19 EDT

## 2023-05-07 NOTE — PROGRESS NOTES
MATERNAL FETAL MEDICINE Consult Note    Dear Dr. Vance,     Thank you for your kind referral of Rhonda Wells.  As you know, she is a 32 y.o.   at  28 1/7 weeks gestation (Estimated Date of Delivery: 23). This is a consult.     Her antepartum course is complicated by:  CHTN  Hx of stillbirth sounds like 2/2 chorio/PPROM  Hx of uterine incision into contractile tissue      Aneuploidy Screening: low risk    HPI: Today, she denies headache, blurry vision, RUQ pain. No vaginal bleeding, no contractions.     Review of History:  Past Medical History:   Diagnosis Date   • Stillbirth     Pt emergency Csection at 8 months. Reports when she woke up he baby had passed away.     Past Surgical History:   Procedure Laterality Date   •  SECTION         Social History     Socioeconomic History   • Marital status: Single   Tobacco Use   • Smoking status: Former     Packs/day: 0.25     Types: Cigarettes     Passive exposure: Never   • Smokeless tobacco: Never   Vaping Use   • Vaping Use: Never used   Substance and Sexual Activity   • Alcohol use: Not Currently   • Drug use: Never   • Sexual activity: Yes     Partners: Male     Family History   Problem Relation Age of Onset   • Diabetes Maternal Aunt    • Diabetes Maternal Grandmother       No Known Allergies   Current Outpatient Medications on File Prior to Visit   Medication Sig Dispense Refill   • Aspirin Low Dose 81 MG EC tablet Take 1 tablet by mouth Daily.     • Prenatal Vit-Fe Fumarate-FA (prenatal vitamin 27-0.8) 27-0.8 MG tablet tablet Take 1 tablet by mouth Daily. 30 tablet 11   • sennosides-docusate (PERICOLACE) 8.6-50 MG per tablet Take 1 tablet by mouth Daily.     • valACYclovir (Valtrex) 500 MG tablet Take 1 tablet by mouth 2 (Two) Times a Day. 60 tablet 4     No current facility-administered medications on file prior to visit.        Past obstetric, gynecological, medical, surgical, family and social history reviewed.  Relevant lab work and  "imaging reviewed.    Review of systems  Constitutional:  denies fever, chills, malaise.   ENT/Mouth:  denies sore throat, tinnitis  Eyes: denies vision changes/pain  CV:  denies chest pain  Respiratory:  denies cough/SOB  GI:  denies N/V, diarrhea, abdominal pain.    :   denies dysuria  Skin:  denies lesions or pruritis   Neuro:  denies weakness, focal neurologic symptoms    Vitals:    23 1000   BP: 128/75   BP Location: Right arm   Patient Position: Sitting   Pulse: 107   Resp: 16   Temp: 98.2 °F (36.8 °C)   TempSrc: Temporal   Weight: 90.2 kg (198 lb 12.8 oz)   Height: 157.5 cm (62\")       PHYSICAL EXAM   GENERAL: Not in acute distress, AAOx3, pleasant  CARDIO: regular rate and rhythm  PULM: symmetric chest rise, speaking in complete sentences without difficulty  NEURO: awake, alert and oriented to person, place, and time  ABDOMINAL: No fundal tenderness, no rebound or guarding, gravid  EXTREMITIES: no bilateral lower extremity edema/tenderness  SKIN: Warm, well-perfused      ULTRASOUND   Please view full ultrasound note on Imaging tab in ViewPoint.      ASSESSMENT/COUNSELIN y.o. G  P  at   /7 weeks gestation (Estimated Date of Delivery: 23)    -Pregnancy  [ X ] stable  [   ] improving [  ] worsening    Diagnoses and all orders for this visit:    1. Pre-existing hypertension affecting pregnancy, antepartum (Primary)  -     [facility]; Future    2. Polyhydramnios in third trimester complication, single or unspecified fetus  -     [facility]; Future    3. History of  section, classical    4. History of  delivery, currently pregnant in second trimester           CHTN  No Meds  We discussed the risks of chronic hypertension including intrauterine growth restriction, increased risk of preeclampsia, increased risk of premature delivery, and increased risk for placental abruption.  I reassured her that with mild hypertension, no underlying " cardiac disease, and normal renal function, most women do well in pregnancy.    Acceptable blood pressures in pregnancies with chronic hypertension and without renal damage are 120-140/70-90s. Newer data from the [facility] (2022, CHAP TRIAL), supports more aggressive bp treatment to below 140/90 (previously ,160 in CHTN) and that this does not impair fetal growth or well-being but reduces risks of pre-eclampsia,  birth, and other pregnancy morbidity.     I recommended serial growth ultrasounds every 4 weeks  to ensure appropriate fetal growth. In addition,  fetal testing 1-2x weekly should be initiated around 32 weeks gestation.  I would recommend a 37 week delivery given CHTN and hx of incision into uterine muscle.       Hx stillbirth:  On ASA.  Sounds like PPROM/labored, had terrible chorio.  Said she did not get an autopsy or other workup.  Recommend APLAS labs to complete the workup since she PPROM/could have had signs of placental insufficiency.  Most likely due to infection, but would still order this to cover bases.        Hx of uterine extension into contractile tissue:  Discussed with patient it is well-documented in her operative report that she had extension (J-incision it sounds like) into contractile tissue and based on what they were seeing, the recommended several times patient not be offered trial of labor.  I reiterated this today and told patient I recommend delivery at 36-37 weeks.  She desires to avoid NICU if possible and go as far as possible, and I think 37 weeks as long as she is not having painful contractions is reasonable.  Did discuss that occasionally early term babies have mild respiratory/hypoglycemia issues necessitating a NICU stay.     Mild polyhydramnios:  Glucola test Friday.  Would get APLAS labs with this.  She understands stable mild/high normal fluid does not increase risks, but want to get this glucola test to make sure diabetes not contributing and make sure  it is stable and not a progressive process.      Summary of Plan  -Serial growth ultrasounds every 4 weeks (by primary OB, we will do next one to follow up mild poly.  )   - 32 weeks: Weekly fetal  surveillance until delivery at primary OB  -Delivery 36-37 weeks--pt desires 37 and I think this is reasonable.  Delivery via CS recommended strongly.      Follow-up: 4 weeks growth    Thank you for the consult and opportunity to care for this patient.  Please feel free to reach out with any questions or concerns.      I spent 25 minutes caring for this patient on this date of service. This time includes time spent by me in the following activities: preparing for the visit, reviewing tests, obtaining and/or reviewing a separately obtained history, performing a medically appropriate examination and/or evaluation, counseling and educating the patient/family/caregiver and independently interpreting results and communicating that information with the patient/family/caregiver with greater than 50% spent in counseling and coordination of care.       I spent 5 minutes on the separately reported service of US imaging not included in the time used to support the E/M service also reported today.      Sintia Horton MD FACOG  Maternal Fetal Medicine-[facility]  Office: [phone]  [email or web address]

## 2023-05-09 ENCOUNTER — HOSPITAL ENCOUNTER (OUTPATIENT)
Dept: ULTRASOUND IMAGING | Facility: HOSPITAL | Age: 33
Discharge: HOME | End: 2023-05-09
Admitting: OBSTETRICS & GYNECOLOGY
Payer: COMMERCIAL

## 2023-05-09 ENCOUNTER — OFFICE VISIT (OUTPATIENT)
Dept: OBSTETRICS AND GYNECOLOGY | Facility: CLINIC | Age: 33
End: 2023-05-09
Payer: COMMERCIAL

## 2023-05-09 VITALS
HEIGHT: 62 IN | WEIGHT: 198.8 LBS | RESPIRATION RATE: 16 BRPM | DIASTOLIC BLOOD PRESSURE: 75 MMHG | BODY MASS INDEX: 36.58 KG/M2 | HEART RATE: 107 BPM | TEMPERATURE: 98.2 F | SYSTOLIC BLOOD PRESSURE: 128 MMHG

## 2023-05-09 DIAGNOSIS — O10.919 PRE-EXISTING HYPERTENSION AFFECTING PREGNANCY, ANTEPARTUM: ICD-10-CM

## 2023-05-09 DIAGNOSIS — O09.892 HISTORY OF PRETERM DELIVERY, CURRENTLY PREGNANT IN SECOND TRIMESTER: ICD-10-CM

## 2023-05-09 DIAGNOSIS — Z98.891 HISTORY OF CESAREAN SECTION, CLASSICAL: ICD-10-CM

## 2023-05-09 DIAGNOSIS — O10.919 PRE-EXISTING HYPERTENSION AFFECTING PREGNANCY, ANTEPARTUM: Primary | ICD-10-CM

## 2023-05-09 DIAGNOSIS — O40.3XX0 POLYHYDRAMNIOS IN THIRD TRIMESTER COMPLICATION, SINGLE OR UNSPECIFIED FETUS: ICD-10-CM

## 2023-05-09 PROCEDURE — 76811 OB US DETAILED SNGL FETUS: CPT

## 2023-05-09 PROCEDURE — 76817 TRANSVAGINAL US OBSTETRIC: CPT

## 2023-05-09 NOTE — PROGRESS NOTES
Pt reports that she is doing well and denies vaginal bleeding, cramping, contractions or LOF at this time. Reports active fetal movement. Reviewed when to call OB office or present to L&D for evaluation with symptoms such as decreased fetal movement, vaginal bleeding, LOF or ctxs. Pt verbalized understanding. Denies HA, visual changes or epigastric pain. Denies any additional complaints at time of appointment. Next OB appointment scheduled for 05/12    Vitals:    05/09/23 1000   BP: 128/75   Pulse: 107   Resp: 16   Temp: 98.2 °F (36.8 °C)

## 2023-05-09 NOTE — LETTER
May 9, 2023     Debra Hilton MD  [address] [facility] [address]    Patient: Rhonda Wells   YOB: 1990   Date of Visit: 2023       Dear Ayan Vance MD,    Thank you for referring Rhonda Wells to me for evaluation. Below is a copy of my consult note.    If you have questions, please do not hesitate to call me. I look forward to following Rhonda along with you.         Sincerely,        Sintia Horton MD        MATERNAL FETAL MEDICINE Consult Note    Dear Dr. Vance,     Thank you for your kind referral of Rhonda Wells.  As you know, she is a 32 y.o.   at  28 1/7 weeks gestation (Estimated Date of Delivery: 23). This is a consult.     Her antepartum course is complicated by:  CHTN  Hx of stillbirth sounds like 2/2 chorio/PPROM  Hx of uterine incision into contractile tissue      Aneuploidy Screening: low risk    HPI: Today, she denies headache, blurry vision, RUQ pain. No vaginal bleeding, no contractions.     Review of History:  Past Medical History:   Diagnosis Date   • Stillbirth     Pt emergency Csection at 8 months. Reports when she woke up he baby had passed away.     Past Surgical History:   Procedure Laterality Date   •  SECTION         Social History     Socioeconomic History   • Marital status: Single   Tobacco Use   • Smoking status: Former     Packs/day: 0.25     Types: Cigarettes     Passive exposure: Never   • Smokeless tobacco: Never   Vaping Use   • Vaping Use: Never used   Substance and Sexual Activity   • Alcohol use: Not Currently   • Drug use: Never   • Sexual activity: Yes     Partners: Male     Family History   Problem Relation Age of Onset   • Diabetes Maternal Aunt    • Diabetes Maternal Grandmother       No Known Allergies   Current Outpatient Medications on File Prior to Visit   Medication Sig Dispense Refill   • Aspirin Low Dose 81 MG EC tablet Take 1 tablet by mouth Daily.     • Prenatal Vit-Fe  "Fumarate-FA (prenatal vitamin 27-0.8) 27-0.8 MG tablet tablet Take 1 tablet by mouth Daily. 30 tablet 11   • sennosides-docusate (PERICOLACE) 8.6-50 MG per tablet Take 1 tablet by mouth Daily.     • valACYclovir (Valtrex) 500 MG tablet Take 1 tablet by mouth 2 (Two) Times a Day. 60 tablet 4     No current facility-administered medications on file prior to visit.        Past obstetric, gynecological, medical, surgical, family and social history reviewed.  Relevant lab work and imaging reviewed.    Review of systems  Constitutional:  denies fever, chills, malaise.   ENT/Mouth:  denies sore throat, tinnitis  Eyes: denies vision changes/pain  CV:  denies chest pain  Respiratory:  denies cough/SOB  GI:  denies N/V, diarrhea, abdominal pain.    :   denies dysuria  Skin:  denies lesions or pruritis   Neuro:  denies weakness, focal neurologic symptoms    Vitals:    23 1000   BP: 128/75   BP Location: Right arm   Patient Position: Sitting   Pulse: 107   Resp: 16   Temp: 98.2 °F (36.8 °C)   TempSrc: Temporal   Weight: 90.2 kg (198 lb 12.8 oz)   Height: 157.5 cm (62\")       PHYSICAL EXAM   GENERAL: Not in acute distress, AAOx3, pleasant  CARDIO: regular rate and rhythm  PULM: symmetric chest rise, speaking in complete sentences without difficulty  NEURO: awake, alert and oriented to person, place, and time  ABDOMINAL: No fundal tenderness, no rebound or guarding, gravid  EXTREMITIES: no bilateral lower extremity edema/tenderness  SKIN: Warm, well-perfused      ULTRASOUND   Please view full ultrasound note on Imaging tab in ViewPoint.      ASSESSMENT/COUNSELIN y.o. G  P  at   /7 weeks gestation (Estimated Date of Delivery: 23)    -Pregnancy  [ X ] stable  [   ] improving [  ] worsening    Diagnoses and all orders for this visit:    1. Pre-existing hypertension affecting pregnancy, antepartum (Primary)  -     [facility]; Future    2. Polyhydramnios in third trimester complication, " single or unspecified fetus  -     [facility]; Future    3. History of  section, classical    4. History of  delivery, currently pregnant in second trimester           CHTN  No Meds  We discussed the risks of chronic hypertension including intrauterine growth restriction, increased risk of preeclampsia, increased risk of premature delivery, and increased risk for placental abruption.  I reassured her that with mild hypertension, no underlying cardiac disease, and normal renal function, most women do well in pregnancy.    Acceptable blood pressures in pregnancies with chronic hypertension and without renal damage are 120-140/70-90s. Newer data from the [facility] (2022, CHAP TRIAL), supports more aggressive bp treatment to below 140/90 (previously ,160 in CHTN) and that this does not impair fetal growth or well-being but reduces risks of pre-eclampsia,  birth, and other pregnancy morbidity.     I recommended serial growth ultrasounds every 4 weeks  to ensure appropriate fetal growth. In addition,  fetal testing 1-2x weekly should be initiated around 32 weeks gestation.  I would recommend a 37 week delivery given CHTN and hx of incision into uterine muscle.       Hx stillbirth:  On ASA.  Sounds like PPROM/labored, had terrible chorio.  Said she did not get an autopsy or other workup.  Recommend APLAS labs to complete the workup since she PPROM/could have had signs of placental insufficiency.  Most likely due to infection, but would still order this to cover bases.        Hx of uterine extension into contractile tissue:  Discussed with patient it is well-documented in her operative report that she had extension (J-incision it sounds like) into contractile tissue and based on what they were seeing, the recommended several times patient not be offered trial of labor.  I reiterated this today and told patient I recommend delivery at 36-37 weeks.  She desires to avoid  NICU if possible and go as far as possible, and I think 37 weeks as long as she is not having painful contractions is reasonable.  Did discuss that occasionally early term babies have mild respiratory/hypoglycemia issues necessitating a NICU stay.     Mild polyhydramnios:  Glucola test Friday.  Would get APLAS labs with this.  She understands stable mild/high normal fluid does not increase risks, but want to get this glucola test to make sure diabetes not contributing and make sure it is stable and not a progressive process.      Summary of Plan  -Serial growth ultrasounds every 4 weeks (by primary OB, we will do next one to follow up mild poly.  )   - 32 weeks: Weekly fetal  surveillance until delivery at primary OB  -Delivery 36-37 weeks--pt desires 37 and I think this is reasonable.  Delivery via CS recommended strongly.      Follow-up: 4 weeks growth    Thank you for the consult and opportunity to care for this patient.  Please feel free to reach out with any questions or concerns.      I spent 25 minutes caring for this patient on this date of service. This time includes time spent by me in the following activities: preparing for the visit, reviewing tests, obtaining and/or reviewing a separately obtained history, performing a medically appropriate examination and/or evaluation, counseling and educating the patient/family/caregiver and independently interpreting results and communicating that information with the patient/family/caregiver with greater than 50% spent in counseling and coordination of care.       I spent 5 minutes on the separately reported service of US imaging not included in the time used to support the E/M service also reported today.      Sintia Horton MD [facility]  Maternal Fetal Medicine-[facility]  Office: [phone]  [email or web address]

## 2023-05-10 ENCOUNTER — PATIENT OUTREACH (OUTPATIENT)
Dept: LABOR AND DELIVERY | Facility: HOSPITAL | Age: 33
End: 2023-05-10
Payer: COMMERCIAL

## 2023-05-10 NOTE — OUTREACH NOTE
[facility]  Check-In    Current Estimated Gestational Age: 28w2d    Questions/Answers    Flowsheet Row Responses   Best Method for Contacting Cell   Demographics Reviewed Yes   Currently Employed Yes   Able to keep appointments as scheduled Yes   Gender(s) and Name(s) girl   Baby Active/Feeling Fetal Movemen Yes   How are you presently feeling? good, getting over covid   May I ask you questions about your substance use? Yes   Other Comment denies   Supplies ready for baby Breast Pump, Car Seat, Clothing, Crib, Diapers   Resource/Environmental Concerns None   Do you have any questions related to your care experience, your pregnancy, plans for delivery, any concerns, etc? No   Other Education Birth Plan, How to find a pediatrician, Insurance benefits/Incentives        Pt saw HAYDEN and confirmed that she will need a c/s. Still hoping that her  can be in the OR with her. She has her home visit with [facility] program this week and also ordered her breast pump. Planning to have a baby shower before the c/s but has all necessary items already. Sent link for c/s class she is interested in and also sent several options for a peds office near her zip code. Reviewed fetal movement. Will plan to f/u in a few weeks.      Shamika Yen RN  Maternity Nurse Navigator    5/10/2023, 14:00 EDT

## 2023-05-11 ENCOUNTER — TELEPHONE (OUTPATIENT)
Dept: LABOR AND DELIVERY | Facility: HOSPITAL | Age: 33
End: 2023-05-11
Payer: COMMERCIAL

## 2023-05-11 NOTE — TELEPHONE ENCOUNTER
Pt called with some questions about hospital tour and c/s class. Will get specific answers and call her back.

## 2023-05-12 ENCOUNTER — ROUTINE PRENATAL (OUTPATIENT)
Dept: OBSTETRICS AND GYNECOLOGY | Facility: CLINIC | Age: 33
End: 2023-05-12
Payer: COMMERCIAL

## 2023-05-12 VITALS — DIASTOLIC BLOOD PRESSURE: 72 MMHG | SYSTOLIC BLOOD PRESSURE: 132 MMHG | BODY MASS INDEX: 36.07 KG/M2 | WEIGHT: 197.2 LBS

## 2023-05-12 DIAGNOSIS — O10.919 PRE-EXISTING HYPERTENSION AFFECTING PREGNANCY, ANTEPARTUM: Primary | ICD-10-CM

## 2023-05-12 DIAGNOSIS — O09.892 HISTORY OF PRETERM DELIVERY, CURRENTLY PREGNANT IN SECOND TRIMESTER: ICD-10-CM

## 2023-05-12 DIAGNOSIS — O09.899 SUPERVISION OF OTHER HIGH RISK PREGNANCIES, UNSPECIFIED TRIMESTER: ICD-10-CM

## 2023-05-12 DIAGNOSIS — O09.292 HISTORY OF STILLBIRTH IN CURRENTLY PREGNANT PATIENT, SECOND TRIMESTER: ICD-10-CM

## 2023-05-12 DIAGNOSIS — Z23 NEED FOR DIPHTHERIA-TETANUS-PERTUSSIS (TDAP) VACCINE: ICD-10-CM

## 2023-05-12 DIAGNOSIS — Z98.891 HISTORY OF CESAREAN SECTION, CLASSICAL: ICD-10-CM

## 2023-05-12 LAB
GLUCOSE UR STRIP-MCNC: NEGATIVE MG/DL
PROT UR STRIP-MCNC: ABNORMAL MG/DL

## 2023-05-12 RX ORDER — OXYTOCIN 10 [USP'U]/ML
999 INJECTION, SOLUTION INTRAMUSCULAR; INTRAVENOUS ONCE
OUTPATIENT
Start: 2023-05-12

## 2023-05-12 RX ORDER — MISOPROSTOL 100 UG/1
800 TABLET ORAL AS NEEDED
OUTPATIENT
Start: 2023-05-12

## 2023-05-12 RX ORDER — ACETAMINOPHEN 500 MG
1000 TABLET ORAL ONCE
OUTPATIENT
Start: 2023-05-12 | End: 2023-05-12

## 2023-05-12 RX ORDER — SODIUM CHLORIDE 0.9 % (FLUSH) 0.9 %
10 SYRINGE (ML) INJECTION AS NEEDED
OUTPATIENT
Start: 2023-05-12

## 2023-05-12 RX ORDER — SODIUM CHLORIDE, SODIUM LACTATE, POTASSIUM CHLORIDE, CALCIUM CHLORIDE 600; 310; 30; 20 MG/100ML; MG/100ML; MG/100ML; MG/100ML
125 INJECTION, SOLUTION INTRAVENOUS CONTINUOUS
OUTPATIENT
Start: 2023-05-12

## 2023-05-12 RX ORDER — ONDANSETRON 4 MG/1
4 TABLET, FILM COATED ORAL EVERY 6 HOURS PRN
OUTPATIENT
Start: 2023-05-12

## 2023-05-12 RX ORDER — KETOROLAC TROMETHAMINE 15 MG/ML
30 INJECTION, SOLUTION INTRAMUSCULAR; INTRAVENOUS ONCE
OUTPATIENT
Start: 2023-05-12 | End: 2023-05-12

## 2023-05-12 RX ORDER — OXYTOCIN 10 [USP'U]/ML
250 INJECTION, SOLUTION INTRAMUSCULAR; INTRAVENOUS CONTINUOUS
OUTPATIENT
Start: 2023-05-12 | End: 2023-05-12

## 2023-05-12 RX ORDER — CARBOPROST TROMETHAMINE 250 UG/ML
250 INJECTION, SOLUTION INTRAMUSCULAR AS NEEDED
OUTPATIENT
Start: 2023-05-12

## 2023-05-12 RX ORDER — LIDOCAINE HYDROCHLORIDE 10 MG/ML
5 INJECTION, SOLUTION EPIDURAL; INFILTRATION; INTRACAUDAL; PERINEURAL AS NEEDED
OUTPATIENT
Start: 2023-05-12

## 2023-05-12 RX ORDER — SODIUM CHLORIDE 0.9 % (FLUSH) 0.9 %
10 SYRINGE (ML) INJECTION EVERY 12 HOURS SCHEDULED
OUTPATIENT
Start: 2023-05-12

## 2023-05-12 RX ORDER — ONDANSETRON 2 MG/ML
4 INJECTION INTRAMUSCULAR; INTRAVENOUS EVERY 6 HOURS PRN
OUTPATIENT
Start: 2023-05-12

## 2023-05-12 NOTE — LETTER
23    SCHEDULING FORM  C-SECTIONS/INDUCTIONS    Patient:  Rhonda Wells  :  1990    Phone: [phone] (home)     OB provider:  Ayan Vance MD    Summary:  32 y.o. female     Type of Delivery:     Priority:  Medical    Desired Date: 07/10/23      Time:      Dating   Estimated Date of Delivery: 23    Gestational age at Desired date of Induction or CS: 37 weeks 0 days  ----------------------------------------------------------------------------  By ACOG Guidelines, women should be 39 weeks or greater before initiating an elective induction (non-medically indicated) delivery     Maternal Indications:   Previous classical CS, Hypertension complicating pregnancy:  cHTN, gHTN, HELLP, PreE    Fetal/Placental Conditions: Polyhydramnios    ----------------------------------------------------------------------------    For patients less than 39 weeks with indications not included in the above list, [facility] consult is required prior to scheduling.    [facility] Approved indication:  no    Date of consult:      I attest that the above entries are accurate to the best of my knowledge:    Ayan Vance MD  2023  17:34 EDT

## 2023-05-12 NOTE — PROGRESS NOTES
Chief complaint: Patient is here for routine prenatal visit  History of present illness: Patient is here for routine prenatal visit today.  She is accompanied by her .  She is without physical complaints today.  She reports active fetal movement.  She reports feeling stressed/anxious over the pregnancy.  She recently saw maternal-fetal medicine, Dr. Horton.  Maternal-fetal medicine agreed with the plan for delivery at 36/37 weeks of gestation.  Patient is very concerned about an ICU admission for the baby, and would like to get closer to 37 weeks.  Maternal-fetal medicine has agreed with the plan for delivery at 37 weeks.  Objective: See vital signs in the flowsheet  General: No acute distress, awake and oriented x3  Abdomen: Soft, nontender, gravid, fetal heart tones 140  Extremities: No lower extremity edema, no tenderness    Summary of maternal-fetal medicine recommendations from 2023:  Summary of Plan  -Serial growth ultrasounds every 4 weeks (by primary OB, we will do next one to follow up mild poly.  )   - 32 weeks: Weekly fetal  surveillance until delivery at primary OB  -Delivery 36-37 weeks--pt desires 37 and I think this is reasonable.  Delivery via CS recommended strongly.    Antiphospholipid antibody panel at this time    Assessment:  1.  32-year-old  3 para 0-1-1-0 at 28-4/7 weeks gestational age  2.  History of prior stillbirth  3.  History of  delivery  4.  History of prior  section with vertical extension  5.  Suspect chronic hypertension  6.  History of herpes, no active lesions    Plan:  1.  We discussed maternal-fetal medicine recommendations at length.  Again we discussed her previous  section which was a low transverse incision with a classical/J extension.  As per the notes from the previous delivery, trial of labor is not recommended.  We discussed maternal-fetal medicine recommendations a trial of labor not be undertaken.  We discussed risks of  uterine rupture and significant risks to mother and baby with a trial of labor.  We also discussed recommendations for timing of delivery at 36-37 weeks of gestation.  The patient would like to get as close to 37 weeks as possible.  Maternal-fetal medicine has agreed with the plan of delivery at 37 weeks.  This would fall on Monday, 7/10/2023.  I called labor and delivery and have the patient scheduled for Monday 7/10 at noon.  2.  Antiphospholipid antibody panel ordered today.  3.  Routine 28-week labs at this time  4.  Patient to return to maternal-fetal medicine in 3-4 weeks for another ultrasound for growth.  At that point we will start weekly  testing, as per maternal-fetal medicine recommendations.  5.  Patient is agreeable with the above plan.  All questions answered.  6.  Tdap today.    I spent 40 minutes today on the care of this patient, including review of the chart and any pertinent prior imaging and labs, interview/exam of the patient, developing care plan, and counseling the patient on management options and excluding any time spent on other separate billable services such as performing procedures or test interpretation, if applicable.

## 2023-05-30 ENCOUNTER — ROUTINE PRENATAL (OUTPATIENT)
Dept: OBSTETRICS AND GYNECOLOGY | Facility: CLINIC | Age: 33
End: 2023-05-30

## 2023-05-30 VITALS — BODY MASS INDEX: 36.43 KG/M2 | SYSTOLIC BLOOD PRESSURE: 140 MMHG | WEIGHT: 199.2 LBS | DIASTOLIC BLOOD PRESSURE: 79 MMHG

## 2023-05-30 DIAGNOSIS — Z98.891 HISTORY OF CESAREAN SECTION, CLASSICAL: ICD-10-CM

## 2023-05-30 DIAGNOSIS — O09.899 SUPERVISION OF OTHER HIGH RISK PREGNANCIES, UNSPECIFIED TRIMESTER: ICD-10-CM

## 2023-05-30 DIAGNOSIS — O10.919 PRE-EXISTING HYPERTENSION AFFECTING PREGNANCY, ANTEPARTUM: Primary | ICD-10-CM

## 2023-05-30 DIAGNOSIS — O09.292 HISTORY OF STILLBIRTH IN CURRENTLY PREGNANT PATIENT, SECOND TRIMESTER: ICD-10-CM

## 2023-05-30 PROBLEM — O09.892 HISTORY OF PRETERM DELIVERY, CURRENTLY PREGNANT IN SECOND TRIMESTER: Status: RESOLVED | Noted: 2023-02-06 | Resolved: 2023-05-30

## 2023-05-30 PROBLEM — O09.293 HISTORY OF STILLBIRTH IN CURRENTLY PREGNANT PATIENT, THIRD TRIMESTER: Status: ACTIVE | Noted: 2023-05-30

## 2023-05-30 PROBLEM — O09.893 HISTORY OF PRETERM DELIVERY, CURRENTLY PREGNANT IN THIRD TRIMESTER: Status: ACTIVE | Noted: 2023-05-30

## 2023-05-30 PROBLEM — O09.211 HISTORY OF PRETERM LABOR, CURRENT PREGNANCY, FIRST TRIMESTER: Status: RESOLVED | Noted: 2022-12-13 | Resolved: 2023-05-30

## 2023-05-30 PROBLEM — O09.291 HISTORY OF STILLBIRTH IN CURRENTLY PREGNANT PATIENT, FIRST TRIMESTER: Status: RESOLVED | Noted: 2022-12-13 | Resolved: 2023-05-30

## 2023-05-30 NOTE — PROGRESS NOTES
Chief complaint: Prenatal visit  History of present illness: Patient is here for her routine prenatal visit.  She is without major physical complaints today.  She does report some fatigue and increased urinary frequency.  She reports very active fetal movement.  She denies contractions, vaginal bleeding, leakage of fluid.  Objective: See vital signs in the flowsheet  General: No acute distress, awake and oriented x3  Abdomen: Soft, gravid, nontender, fetal heart tones 150  Extremities: No lower extremity edema, no tenderness    Labs: 1 hour glucose challenge normal, CBC normal.  Anticardiolipin antibody and lupus anticoagulant negative    Assessment:  1.  32-year-old  3 para 0-1-1-0 at 31-1/7 weeks gestational age  2.  Chronic hypertension, currently controlled on no medication  3.  History of stillbirth  4.  History of prior  delivery  5.  History of prior classical  section    Plan:  1.  Patient's blood pressure slightly elevated today.  I am aware of new recommendations regarding for tighter control of blood pressure and the goal of blood pressure less than 140/90.  As today is the first day that she had reached a height 140 fady, would recommend observation for now.  If persistently greater than 140 will start oral antihypertensives.  2.  Kick counts discussed with patient.  3.  Patient has MFM appointment next week.  Beginning next week she will start at least weekly ultrasounds.  4.  Discussed recent lab work results.  5 return to the office in 2 weeks.

## 2023-05-31 ENCOUNTER — PATIENT OUTREACH (OUTPATIENT)
Dept: LABOR AND DELIVERY | Facility: HOSPITAL | Age: 33
End: 2023-05-31

## 2023-05-31 NOTE — OUTREACH NOTE
[facility]  Check-In    Current Estimated Gestational Age: 31w2d    Questions/Answers    Flowsheet Row Responses   Best Method for Contacting Cell   Demographics Reviewed Yes   Currently Employed Yes   Able to keep appointments as scheduled Yes   Gender(s) and Name(s) girl   Baby Active/Feeling Fetal Movemen Yes   How are you presently feeling? good   Supplies ready for baby Breast Pump, Car Seat, Clothing, Crib, Diapers, Feeding Supplies   Resource/Environmental Concerns None   Do you have any questions related to your care experience, your pregnancy, plans for delivery, any concerns, etc? No   Other Education Insurance benefits/Incentives, Birth Plan, How to find a pediatrician        Pt returned my call after work. She is doing well, baby is very active. Lots of anxiety about c/s and we discussed this. Her mom will be her main support in the OR and I am working to arrange with L&D that  can be present as well. Discussed many details of procedure and provided reassurance. She is working on selecting a peds office. She has all necessary infant items with a baby shower planned for July. Will f/u around 35 weeks.     Shamika Yen RN  Maternity Nurse Navigator    5/31/2023, 15:56 EDT

## 2023-05-31 NOTE — OUTREACH NOTE
[facility]  Unable to Reach       Questions/Answers    Flowsheet Row Responses   Pending Outreach Prenatal Check-in   Call Attempt First   Outcome Left message   Next Call Attempt Date 06/14/23        Left VM, will plan to try again in a few weeks for 35 week check in.     Shamika Yen RN  Maternity Nurse Navigator    5/31/2023, 11:28 EDT

## 2023-06-04 NOTE — PROGRESS NOTES
MATERNAL FETAL MEDICINE Consult Note    Dear Dr. Vance,     Thank you for your kind referral of Rhonda Wells.  As you know, she is a 32 y.o.   at  32 1/7 weeks gestation (Estimated Date of Delivery: 23). This is a consult.     Her antepartum course is complicated by:  CHTN  Hx of stillbirth sounds like 2/2 chorio/PPROM  Hx of uterine incision into contractile tissue      Aneuploidy Screening: low risk    HPI: Today, she denies headache, blurry vision, RUQ pain. No vaginal bleeding, no contractions.     Review of History:  Past Medical History:   Diagnosis Date    Stillbirth     Pt emergency Csection at 8 months. Reports when she woke up he baby had passed away.     Past Surgical History:   Procedure Laterality Date     SECTION         Social History     Socioeconomic History    Marital status: Single   Tobacco Use    Smoking status: Former     Packs/day: 0.25     Types: Cigarettes     Passive exposure: Never    Smokeless tobacco: Never   Vaping Use    Vaping Use: Never used   Substance and Sexual Activity    Alcohol use: Not Currently    Drug use: Never    Sexual activity: Yes     Partners: Male     Family History   Problem Relation Age of Onset    Diabetes Maternal Aunt     Diabetes Maternal Grandmother       No Known Allergies   Current Outpatient Medications on File Prior to Visit   Medication Sig Dispense Refill    Aspirin Low Dose 81 MG EC tablet Take 1 tablet by mouth Daily.      Prenatal Vit-Fe Fumarate-FA (prenatal vitamin 27-0.8) 27-0.8 MG tablet tablet Take 1 tablet by mouth Daily. 30 tablet 11    valACYclovir (Valtrex) 500 MG tablet Take 1 tablet by mouth 2 (Two) Times a Day. 60 tablet 4    [DISCONTINUED] sennosides-docusate (PERICOLACE) 8.6-50 MG per tablet Take 1 tablet by mouth Daily.       No current facility-administered medications on file prior to visit.        Past obstetric, gynecological, medical, surgical, family and social history reviewed.  Relevant lab work and  imaging reviewed.    Review of systems  Constitutional:  denies fever, chills, malaise.   ENT/Mouth:  denies sore throat, tinnitis  Eyes: denies vision changes/pain  CV:  denies chest pain  Respiratory:  denies cough/SOB  GI:  denies N/V, diarrhea, abdominal pain.    :   denies dysuria  Skin:  denies lesions or pruritis   Neuro:  denies weakness, focal neurologic symptoms    Vitals:    23 1009   BP: 135/72   BP Location: Right arm   Patient Position: Sitting   Pulse: 98   Temp: 97.8 °F (36.6 °C)   TempSrc: Temporal   Weight: 89.9 kg (198 lb 3.2 oz)         PHYSICAL EXAM   GENERAL: Not in acute distress, AAOx3, pleasant  CARDIO: regular rate and rhythm  PULM: symmetric chest rise, speaking in complete sentences without difficulty  NEURO: awake, alert and oriented to person, place, and time  ABDOMINAL: No fundal tenderness, no rebound or guarding, gravid  EXTREMITIES: no bilateral lower extremity edema/tenderness  SKIN: Warm, well-perfused      ULTRASOUND   Please view full ultrasound note on Imaging tab in ViewPoint.  Cephalic presentation.  Posterior placenta.  KYLEIGH 21 cm, which is normal.   EFW 1985 g (50% AC 86%)  BPP 8/    ASSESSMENT/COUNSELIN y.o.   at  32 1/7 weeks gestation (Estimated Date of Delivery: 23).     -Pregnancy  [ X ] stable  [   ] improving [  ] worsening    Diagnoses and all orders for this visit:    1. Pre-existing hypertension affecting pregnancy, antepartum (Primary)    2. History of  section, classical    3. History of stillbirth in currently pregnant patient, second trimester    4. History of  delivery, currently pregnant in second trimester    Other orders  -     sennosides-docusate (PERICOLACE) 8.6-50 MG per tablet; Take 1 tablet by mouth Daily.  Dispense: 30 tablet; Refill: 5             CHTN  No Meds  We discussed the risks of chronic hypertension including intrauterine growth restriction, increased risk of preeclampsia, increased risk of  premature delivery, and increased risk for placental abruption.  I reassured her that with mild hypertension, no underlying cardiac disease, and normal renal function, most women do well in pregnancy.    Acceptable blood pressures in pregnancies with chronic hypertension and without renal damage are 120-140/70-90s. Newer data from the [facility] (2022, CHAP TRIAL), supports more aggressive bp treatment to below 140/90 (previously ,160 in CHTN) and that this does not impair fetal growth or well-being but reduces risks of pre-eclampsia,  birth, and other pregnancy morbidity.     I recommended serial growth ultrasounds every 4 weeks  to ensure appropriate fetal growth. In addition,  fetal testing 1-2x weekly should be initiated around 32 weeks gestation.  I would recommend a 37 week delivery given CHTN and hx of incision into uterine muscle.       Hx stillbirth:  On ASA.  Sounds like PPROM/labored, had terrible chorio.  Said she did not get an autopsy or other workup.  APLAS noted and negative       Hx of uterine extension into contractile tissue:  Discussed with patient it is well-documented in her operative report that she had extension (J-incision it sounds like) into contractile tissue and based on what they were seeing, the recommended several times patient not be offered trial of labor.  I reiterated this today and told patient I recommend delivery at 36-37 weeks.  She desires to avoid NICU if possible and go as far as possible, and I think 37 weeks as long as she is not having painful contractions is reasonable.  Did discuss that occasionally early term babies have mild respiratory/hypoglycemia issues necessitating a NICU stay.     Mild polyhydramnios, resolved:  Congratulated patient. She asked about weight at term and I said estimated 7ish in the early term period.  Her baby is 50% today and the abdomen is on the larger side.  We discussed I expect her baby to be term and normally grown but she  should continue serial growth exams with primary OB to monitor.  We discussed last time the mild respiratory distress, etc. That can happen with early term babies.  We discussed hydration and  labor precautions and listening to signs that she is pushing herself too much to avoid willy.      Summary of Plan  -Serial growth ultrasounds every 4 weeks (by primary OB)  - 32 weeks: Weekly fetal  surveillance until delivery at primary OB  -Delivery 36-37 weeks--pt desires 37 and I think this is reasonable.  Delivery via CS recommended strongly.      Follow-up: 4 weeks growth    Thank you for the consult and opportunity to care for this patient.  Please feel free to reach out with any questions or concerns.      I spent 15 minutes caring for this patient on this date of service. This time includes time spent by me in the following activities: preparing for the visit, reviewing tests, obtaining and/or reviewing a separately obtained history, performing a medically appropriate examination and/or evaluation, counseling and educating the patient/family/caregiver and independently interpreting results and communicating that information with the patient/family/caregiver with greater than 50% spent in counseling and coordination of care.       I spent 5 minutes on the separately reported service of US imaging not included in the time used to support the E/M service also reported today.      Sintia Horton MD [facility] [place] Fetal Medicine-[facility]  Office: [phone]  [email or web address]

## 2023-06-05 ENCOUNTER — TRANSCRIBE ORDERS (OUTPATIENT)
Dept: ULTRASOUND IMAGING | Facility: HOSPITAL | Age: 33
End: 2023-06-05
Payer: COMMERCIAL

## 2023-06-05 DIAGNOSIS — O09.899 SUPERVISION OF OTHER HIGH RISK PREGNANCIES, UNSPECIFIED TRIMESTER: Primary | ICD-10-CM

## 2023-06-07 ENCOUNTER — HOSPITAL ENCOUNTER (OUTPATIENT)
Dept: ULTRASOUND IMAGING | Facility: HOSPITAL | Age: 33
Discharge: HOME | End: 2023-06-07
Admitting: OBSTETRICS & GYNECOLOGY
Payer: COMMERCIAL

## 2023-06-07 ENCOUNTER — TELEPHONE (OUTPATIENT)
Dept: OBSTETRICS AND GYNECOLOGY | Facility: CLINIC | Age: 33
End: 2023-06-07
Payer: COMMERCIAL

## 2023-06-07 ENCOUNTER — OFFICE VISIT (OUTPATIENT)
Dept: OBSTETRICS AND GYNECOLOGY | Facility: CLINIC | Age: 33
End: 2023-06-07
Payer: COMMERCIAL

## 2023-06-07 VITALS
SYSTOLIC BLOOD PRESSURE: 135 MMHG | WEIGHT: 198.2 LBS | DIASTOLIC BLOOD PRESSURE: 72 MMHG | TEMPERATURE: 97.8 F | HEART RATE: 98 BPM | BODY MASS INDEX: 36.25 KG/M2

## 2023-06-07 DIAGNOSIS — O10.919 PRE-EXISTING HYPERTENSION AFFECTING PREGNANCY, ANTEPARTUM: Primary | ICD-10-CM

## 2023-06-07 DIAGNOSIS — O09.292 HISTORY OF STILLBIRTH IN CURRENTLY PREGNANT PATIENT, SECOND TRIMESTER: ICD-10-CM

## 2023-06-07 DIAGNOSIS — O09.899 SUPERVISION OF OTHER HIGH RISK PREGNANCIES, UNSPECIFIED TRIMESTER: ICD-10-CM

## 2023-06-07 DIAGNOSIS — O09.892 HISTORY OF PRETERM DELIVERY, CURRENTLY PREGNANT IN SECOND TRIMESTER: ICD-10-CM

## 2023-06-07 DIAGNOSIS — Z98.891 HISTORY OF CESAREAN SECTION, CLASSICAL: ICD-10-CM

## 2023-06-07 PROCEDURE — 76819 FETAL BIOPHYS PROFIL W/O NST: CPT

## 2023-06-07 PROCEDURE — 76816 OB US FOLLOW-UP PER FETUS: CPT

## 2023-06-07 RX ORDER — AMOXICILLIN 250 MG
1 CAPSULE ORAL DAILY
Qty: 30 TABLET | Refills: 5 | Status: SHIPPED | OUTPATIENT
Start: 2023-06-07

## 2023-06-07 NOTE — LETTER
2023     CAT Vidal  [address] [address]    Patient: Rhonda Wells   YOB: 1990   Date of Visit: 2023       Dear CAT Vidal:    Thank you for referring Rhonda Wells to me for evaluation. Below are the relevant portions of my assessment and plan of care.    Encounter Diagnosis and Orders:       If you have questions, please do not hesitate to call me. I look forward to following Rhonda along with you.         Sincerely,        Sintia Horton MD    MATERNAL FETAL MEDICINE Consult Note    Dear Dr. Vance,     Thank you for your kind referral of Rhonda Wells.  As you know, she is a 32 y.o.   at  32 1/7 weeks gestation (Estimated Date of Delivery: 23). This is a consult.     Her antepartum course is complicated by:  CHTN  Hx of stillbirth sounds like 2/2 chorio/PPROM  Hx of uterine incision into contractile tissue      Aneuploidy Screening: low risk    HPI: Today, she denies headache, blurry vision, RUQ pain. No vaginal bleeding, no contractions.     Review of History:  Past Medical History:   Diagnosis Date   • Stillbirth     Pt emergency Csection at 8 months. Reports when she woke up he baby had passed away.     Past Surgical History:   Procedure Laterality Date   •  SECTION         Social History     Socioeconomic History   • Marital status: Single   Tobacco Use   • Smoking status: Former     Packs/day: 0.25     Types: Cigarettes     Passive exposure: Never   • Smokeless tobacco: Never   Vaping Use   • Vaping Use: Never used   Substance and Sexual Activity   • Alcohol use: Not Currently   • Drug use: Never   • Sexual activity: Yes     Partners: Male     Family History   Problem Relation Age of Onset   • Diabetes Maternal Aunt    • Diabetes Maternal Grandmother       No Known Allergies   Current Outpatient Medications on File Prior to Visit   Medication Sig Dispense Refill   • Aspirin Low Dose 81 MG EC tablet Take 1  tablet by mouth Daily.     • Prenatal Vit-Fe Fumarate-FA (prenatal vitamin 27-0.8) 27-0.8 MG tablet tablet Take 1 tablet by mouth Daily. 30 tablet 11   • valACYclovir (Valtrex) 500 MG tablet Take 1 tablet by mouth 2 (Two) Times a Day. 60 tablet 4   • [DISCONTINUED] sennosides-docusate (PERICOLACE) 8.6-50 MG per tablet Take 1 tablet by mouth Daily.       No current facility-administered medications on file prior to visit.        Past obstetric, gynecological, medical, surgical, family and social history reviewed.  Relevant lab work and imaging reviewed.    Review of systems  Constitutional:  denies fever, chills, malaise.   ENT/Mouth:  denies sore throat, tinnitis  Eyes: denies vision changes/pain  CV:  denies chest pain  Respiratory:  denies cough/SOB  GI:  denies N/V, diarrhea, abdominal pain.    :   denies dysuria  Skin:  denies lesions or pruritis   Neuro:  denies weakness, focal neurologic symptoms    Vitals:    23 1009   BP: 135/72   BP Location: Right arm   Patient Position: Sitting   Pulse: 98   Temp: 97.8 °F (36.6 °C)   TempSrc: Temporal   Weight: 89.9 kg (198 lb 3.2 oz)         PHYSICAL EXAM   GENERAL: Not in acute distress, AAOx3, pleasant  CARDIO: regular rate and rhythm  PULM: symmetric chest rise, speaking in complete sentences without difficulty  NEURO: awake, alert and oriented to person, place, and time  ABDOMINAL: No fundal tenderness, no rebound or guarding, gravid  EXTREMITIES: no bilateral lower extremity edema/tenderness  SKIN: Warm, well-perfused      ULTRASOUND   Please view full ultrasound note on Imaging tab in ViewPoint.  Cephalic presentation.  Posterior placenta.  KYLEIGH 21 cm, which is normal.   EFW 1985 g (50% AC 86%)  BPP     ASSESSMENT/COUNSELIN y.o.   at  32 1/7 weeks gestation (Estimated Date of Delivery: 23).     -Pregnancy  [ X ] stable  [   ] improving [  ] worsening    Diagnoses and all orders for this visit:    1. Pre-existing hypertension affecting  pregnancy, antepartum (Primary)    2. History of  section, classical    3. History of stillbirth in currently pregnant patient, second trimester    4. History of  delivery, currently pregnant in second trimester    Other orders  -     sennosides-docusate (PERICOLACE) 8.6-50 MG per tablet; Take 1 tablet by mouth Daily.  Dispense: 30 tablet; Refill: 5             CHTN  No Meds  We discussed the risks of chronic hypertension including intrauterine growth restriction, increased risk of preeclampsia, increased risk of premature delivery, and increased risk for placental abruption.  I reassured her that with mild hypertension, no underlying cardiac disease, and normal renal function, most women do well in pregnancy.    Acceptable blood pressures in pregnancies with chronic hypertension and without renal damage are 120-140/70-90s. Newer data from the [facility] (2022, CHAP TRIAL), supports more aggressive bp treatment to below 140/90 (previously ,160 in CHTN) and that this does not impair fetal growth or well-being but reduces risks of pre-eclampsia,  birth, and other pregnancy morbidity.     I recommended serial growth ultrasounds every 4 weeks  to ensure appropriate fetal growth. In addition,  fetal testing 1-2x weekly should be initiated around 32 weeks gestation.  I would recommend a 37 week delivery given CHTN and hx of incision into uterine muscle.       Hx stillbirth:  On ASA.  Sounds like PPROM/labored, had terrible chorio.  Said she did not get an autopsy or other workup.  APLAS noted and negative       Hx of uterine extension into contractile tissue:  Discussed with patient it is well-documented in her operative report that she had extension (J-incision it sounds like) into contractile tissue and based on what they were seeing, the recommended several times patient not be offered trial of labor.  I reiterated this today and told patient I recommend delivery at 36-37 weeks.  She  desires to avoid NICU if possible and go as far as possible, and I think 37 weeks as long as she is not having painful contractions is reasonable.  Did discuss that occasionally early term babies have mild respiratory/hypoglycemia issues necessitating a NICU stay.     Mild polyhydramnios, resolved:  Congratulated patient. She asked about weight at term and I said estimated 7ish in the early term period.  Her baby is 50% today and the abdomen is on the larger side.  We discussed I expect her baby to be term and normally grown but she should continue serial growth exams with primary OB to monitor.  We discussed last time the mild respiratory distress, etc. That can happen with early term babies.  We discussed hydration and  labor precautions and listening to signs that she is pushing herself too much to avoid willy.      Summary of Plan  -Serial growth ultrasounds every 4 weeks (by primary OB)  - 32 weeks: Weekly fetal  surveillance until delivery at primary OB  -Delivery 36-37 weeks--pt desires 37 and I think this is reasonable.  Delivery via CS recommended strongly.      Follow-up: 4 weeks growth    Thank you for the consult and opportunity to care for this patient.  Please feel free to reach out with any questions or concerns.      I spent 15 minutes caring for this patient on this date of service. This time includes time spent by me in the following activities: preparing for the visit, reviewing tests, obtaining and/or reviewing a separately obtained history, performing a medically appropriate examination and/or evaluation, counseling and educating the patient/family/caregiver and independently interpreting results and communicating that information with the patient/family/caregiver with greater than 50% spent in counseling and coordination of care.       I spent 5 minutes on the separately reported service of US imaging not included in the time used to support the E/M service also reported  today.      Sintia Horton MD [facility] [place]  Maternal Fetal Medicine-[facility]  Office: [phone]  [email or web address]

## 2023-06-07 NOTE — TELEPHONE ENCOUNTER
Pt is requesting Colace please be sent to pharmacy. I did inform her that it looks like Pericolace was sent into pharmacy by [facility], she says provider in our office sends in something different and would like that sent in instead due to insurance.     Please advise,   Thank you

## 2023-06-13 ENCOUNTER — ROUTINE PRENATAL (OUTPATIENT)
Dept: OBSTETRICS AND GYNECOLOGY | Facility: CLINIC | Age: 33
End: 2023-06-13
Payer: COMMERCIAL

## 2023-06-13 VITALS — SYSTOLIC BLOOD PRESSURE: 142 MMHG | DIASTOLIC BLOOD PRESSURE: 83 MMHG | WEIGHT: 201.2 LBS | BODY MASS INDEX: 36.8 KG/M2

## 2023-06-13 DIAGNOSIS — Z98.891 HISTORY OF CESAREAN SECTION, CLASSICAL: ICD-10-CM

## 2023-06-13 DIAGNOSIS — Z3A.33 33 WEEKS GESTATION OF PREGNANCY: Primary | ICD-10-CM

## 2023-06-13 DIAGNOSIS — O34.219 PREVIOUS CESAREAN DELIVERY AFFECTING PREGNANCY, ANTEPARTUM: ICD-10-CM

## 2023-06-13 DIAGNOSIS — O10.919 PRE-EXISTING HYPERTENSION AFFECTING PREGNANCY, ANTEPARTUM: ICD-10-CM

## 2023-06-13 DIAGNOSIS — O09.899 SUPERVISION OF OTHER HIGH RISK PREGNANCIES, UNSPECIFIED TRIMESTER: ICD-10-CM

## 2023-06-13 DIAGNOSIS — O09.293 HISTORY OF STILLBIRTH IN CURRENTLY PREGNANT PATIENT, THIRD TRIMESTER: ICD-10-CM

## 2023-06-13 LAB
GLUCOSE UR STRIP-MCNC: NEGATIVE MG/DL
PROT UR STRIP-MCNC: NEGATIVE MG/DL

## 2023-06-13 NOTE — PROGRESS NOTES
Chief complaint: Prenatal visit  History of present was: Patient is here for her routine prenatal visit.  She is without major complaints today.  She denies regular contractions, vaginal bleeding, leakage of fluid.  She reports active fetal movement.    Objective: See vital signs in the flowsheet  General: No acute distress, awake and oriented x3  Abdomen: Soft, nontender, fetal heart tones 148  Extremities: No lower extremity edema, no calf tenderness    Ultrasound today:  Findings:  Cephalic presentation  Posterior placenta  Fetal heart rate: 148 bpm    Amniotic fluid index:  Quadrant 1: 3.85 cm  Quadrant 2: 2.27 cm  Quadrant 3: 2.22 cm  Quadrant 4: 4.09 cm  Total: 12.43 cm    Biophysical profile:  Fetal breathing movements: 2/2  Gross fetal movements: 2/2  Fetal tone: 2/2  Amniotic fluid: 2/2  Total: 8/8      Impression:  Normal biophysical profile.  Normal amniotic fluid.    Assessment:  1.  32-year-old  3 para 0-1-1-0 at 33-1/7 weeks gestational age  2.  Chronic hypertension in pregnancy, currently on medication  3.  History of prior classical  section  4.  History of IUFD    Plan:  1.  Patient's blood pressures borderline again today.  Goal is to maintain blood pressure less than 140/90.  I advised the patient to try to obtain a blood pressure cuff and check her blood pressures at home daily and record results to bring with her to her next visit.  If blood pressures are consistently greater than 140/90, will start antihypertensive medication  2.  Continue weekly biophysical profile, and growth every 4 weeks  3 return to the office in 1 week.

## 2023-07-10 PROBLEM — Z98.891 STATUS POST CESAREAN DELIVERY: Status: ACTIVE | Noted: 2023-07-10

## 2023-07-10 PROBLEM — O34.219 PREVIOUS CESAREAN DELIVERY, ANTEPARTUM: Status: ACTIVE | Noted: 2023-07-10

## 2023-07-28 ENCOUNTER — PATIENT OUTREACH (OUTPATIENT)
Dept: CALL CENTER | Facility: HOSPITAL | Age: 33
End: 2023-07-28
Payer: COMMERCIAL

## 2023-07-28 NOTE — OUTREACH NOTE
Motherhood Connection Survey      Flowsheet Row Responses   [facility] [employer] facility patient discharged from? [facility]   Week 1 attempt successful? No   Unsuccessful attempts Attempt 1   Reschedule Tomorrow              Cori HALE - Licensed Nurse

## 2023-07-29 ENCOUNTER — PATIENT OUTREACH (OUTPATIENT)
Dept: CALL CENTER | Facility: HOSPITAL | Age: 33
End: 2023-07-29
Payer: COMMERCIAL

## 2023-07-29 NOTE — OUTREACH NOTE
Motherhood Connection Survey      Flowsheet Row Responses   [facility]   [facility] attempt successful? Yes   Call end time 1527   Baby sex Girl    discharged home with mother? Yes   Baby sex Girl   Delivery type    Emotional state Acceptance   Family support Yes   Do you have all necessary resources to care for you and your baby?  Yes   Have members of your household adjusted to your baby? Yes   Did you have any problems with pre-eclampsia during this pregnancy? No   Did you have blood glucose issues during this pregnancy No   Lochia amount Light   Lochia per patient report Rubra   Did you have an episiotomy/tear/abdominal incision? Yes   Feeding Method Combination   Frequency q 2hrs   Duration 15-20 min each side   Pumping Yes   Supplementing Breast Milk, Formula   Frequency q 3 hrs formula or MBM supplement after nursing   Amount 2 oz   Breast Condition No   Nipple Condition No   Nursing Interventions Lactation education provided   Signs baby is ready to eat Rooting, Crying, Finger sucking   Feeding tolerance Adequate pause for breath   Number of wet diapers x 24 hours 8-10   Last BM x 24 hours 6   Umbilical Cord No reported signs or symptoms   Umbilical cord comments cord off   Where does the baby usually sleep? Bassinet   Are there stuffed animals, toys, pillows, quilts, blankets, wedges, positioners, bumpers or other loose bedding in the infant's sleeping environment? No   Does the baby ever share a sleep surface in a bed, couch, recliner or other? No   What position do you lay your baby down to sleep? Back   Are you and/or other caregivers smoking inside or outside the baby's home? No   Mom appointment comments: PP f/u done   Baby appointment comments: Peds visits x3   Call completed? Yes   How satisfied were you with the Motherhood Connection Program? 5              Livia HALE - Registered Nurse

## 2023-08-04 RX ORDER — VALACYCLOVIR HYDROCHLORIDE 500 MG/1
TABLET, FILM COATED ORAL
Qty: 60 TABLET | Refills: 4 | Status: SHIPPED | OUTPATIENT
Start: 2023-08-04

## 2023-08-28 ENCOUNTER — POSTPARTUM VISIT (OUTPATIENT)
Dept: OBSTETRICS AND GYNECOLOGY | Facility: CLINIC | Age: 33
End: 2023-08-28
Payer: COMMERCIAL

## 2023-08-28 VITALS
SYSTOLIC BLOOD PRESSURE: 140 MMHG | HEIGHT: 62 IN | BODY MASS INDEX: 34.45 KG/M2 | DIASTOLIC BLOOD PRESSURE: 80 MMHG | WEIGHT: 187.2 LBS

## 2023-08-28 DIAGNOSIS — I10 ESSENTIAL HYPERTENSION: ICD-10-CM

## 2023-08-28 DIAGNOSIS — Z30.09 BIRTH CONTROL COUNSELING: ICD-10-CM

## 2023-08-28 NOTE — PROGRESS NOTES
Chief Complaint  Postpartum Care (Patient is here today to discuss birth control. Wanting nexplanon. Has questions concerning PPD)    Subjective        Rhonda GAURANG Wells presents to [facility]  History of Present Illness  Patient is here today both to discuss birth control and to discuss discuss concerns over postpartum depression.  She is about 7 weeks status post repeat .  She reports that she has had her first menstrual cycle since the delivery.  She says it was heavy, but this was similar to her first menstrual cycle after her first .  It has resolved now.  She is breast-feeding and supplementing with formula.  Patient reports having concerns about postpartum depression.  She says that she had it to some extent after her first delivery (stillbirth at roughly 35 weeks) but that it resolved spontaneously quickly.  She reports that she is for started to notice symptoms just a few days after arriving home from the hospital.  He says that at that point the reality of having a  setting with her.  She says things have gotten worse since her partner has returned to work.  She reports feeling down today.  She reports occasional crying spells.  She reports difficulties with sleep.  She reports decreased appetite.  She says she does not have many things that she looks forward to throughout the day.  She has been trying to go outside some, especially now that the baby is older.  She says that she has [place] no thoughts of wanting to harm herself or harm anyone else, including the baby.  She has started seeing a counselor/therapist, and she feels this has been helpful.  Patient reports that during her last pregnancy and in the postpartum setting she took first Zoloft and then Lexapro after delivery.  She says that she felt that they both helped her.  She says she felt the Zoloft was actually helping more.  She is interested in starting on medication again.    Regarding birth  "control, patient has chronic hypertension.  I would not recommend birth control that has estrogen.  She states that she does not want to become pregnant anytime in the near future, if ever.  She is interested in long-acting reversible contraception.  She think she is most Nexplanon.    She says that from a postsurgical standpoint she is doing well.  Pain has resolved.  She denies problems with the incision.    The following portions of the patient's history were reviewed and updated as appropriate: allergies, current medications, past family history, past medical history, past social history, past surgical history, and problem list.    Objective   Vital Signs:  /80   Ht 157.5 cm (62\")   Wt 84.9 kg (187 lb 3.2 oz)   BMI 34.24 kg/mý   Estimated body mass index is 34.24 kg/mý as calculated from the following:    Height as of this encounter: 157.5 cm (62\").    Weight as of this encounter: 84.9 kg (187 lb 3.2 oz).             Physical Exam   General: No acute distress, awake and oriented x3  Lungs: Clear to auscultation bilaterally  Abdomen: Soft, nontender, nondistended, normoactive bowel sounds  Incision: Completely healed  Extremities: No Tenderness, no Homans sign, no lower extremity edema  Psychiatric: Good judgment insight, normal affect and mood    Result Review :                   Assessment and Plan   Diagnoses and all orders for this visit:    1. Postpartum depression (Primary)  -     sertraline (Zoloft) 50 MG tablet; Take 1 tablet by mouth Daily.  Dispense: 30 tablet; Refill: 1    Patient with signs and symptoms of postpartum depression.  Seems mild to moderate at this time.  She denies any thoughts of wanting to harm herself or harm others.  She does not appear to be a risk to herself or to anyone else at this time.  She is taking all of the appropriate steps to get help including seeing a counselor/therapist and discussing her concerns with her OB/GYN.  She is interested in starting a medication.  " She has previously used Zoloft in the past which she says has helped her.  I believe that starting Zoloft again will be beneficial.  The risk, benefits, alternatives, and side effects were discussed.  We discussed starting and stopping this medication.  We will start Zoloft today.  I like to reevaluate in 4 weeks to see how this is going.    2. Birth control counseling    Various contraceptive options discussed at length including combined oral contraceptives, contraceptive patch, NuvaRing, progesterone only contraceptive, Depo-Provera, Nexplanon, progesterone IUD, copper IUD, and barrier methods.  The risks, benefits, and alternatives were discussed.  The patient elects for Nexplanon.  Side effects such as irregular bleeding, weight gain, breast tenderness, etc. were discussed with the patient.  She verbalizes understanding.  She is encouraged to use condoms as a backup method of birth control until she has her Nexplanon inserted and for 7 days after insertion.  She can return tomorrow for Nexplanon insertion      3. Essential hypertension  -     Ambulatory Referral to Internal Medicine    Patient request referral to primary care because she is unhappy with her current primary care.  Referral placed.    I spent 30 minutes today on the care of this patient, including review of the chart and any pertinent prior imaging and labs, interview/exam of the patient, developing care plan, and counseling the patient on management options and excluding any time spent on other separate billable services such as performing procedures or test interpretation, if applicable.             Follow Up   Return in about 1 day (around 8/29/2023), or nexplanon insertion.  Patient was given instructions and counseling regarding her condition or for health maintenance advice. Please see specific information pulled into the AVS if appropriate.

## 2023-08-29 ENCOUNTER — OFFICE VISIT (OUTPATIENT)
Dept: OBSTETRICS AND GYNECOLOGY | Facility: CLINIC | Age: 33
End: 2023-08-29
Payer: COMMERCIAL

## 2023-08-29 VITALS
BODY MASS INDEX: 34.41 KG/M2 | HEIGHT: 62 IN | DIASTOLIC BLOOD PRESSURE: 79 MMHG | SYSTOLIC BLOOD PRESSURE: 130 MMHG | WEIGHT: 187 LBS

## 2023-08-29 DIAGNOSIS — Z30.017 NEXPLANON INSERTION: Primary | ICD-10-CM

## 2023-08-29 PROBLEM — O34.219 PREVIOUS CESAREAN DELIVERY, ANTEPARTUM: Status: RESOLVED | Noted: 2023-07-10 | Resolved: 2023-08-29

## 2023-08-29 PROBLEM — O34.219 PREVIOUS CESAREAN DELIVERY AFFECTING PREGNANCY, ANTEPARTUM: Status: RESOLVED | Noted: 2022-12-13 | Resolved: 2023-08-29

## 2023-08-29 PROBLEM — I10 BENIGN ESSENTIAL HYPERTENSION: Status: ACTIVE | Noted: 2023-08-29

## 2023-08-29 PROBLEM — B00.9 HERPES VIRUS INFECTION IN MOTHER DURING PREGNANCY, ANTEPARTUM: Status: RESOLVED | Noted: 2023-02-24 | Resolved: 2023-08-29

## 2023-08-29 PROBLEM — O10.919 PRE-EXISTING HYPERTENSION AFFECTING PREGNANCY, ANTEPARTUM: Status: RESOLVED | Noted: 2023-03-06 | Resolved: 2023-08-29

## 2023-08-29 PROBLEM — Z98.891 STATUS POST CESAREAN DELIVERY: Status: RESOLVED | Noted: 2023-07-10 | Resolved: 2023-08-29

## 2023-08-29 PROBLEM — O09.899 SUPERVISION OF OTHER HIGH RISK PREGNANCIES, UNSPECIFIED TRIMESTER: Status: RESOLVED | Noted: 2023-01-10 | Resolved: 2023-08-29

## 2023-08-29 PROBLEM — O98.519 HERPES VIRUS INFECTION IN MOTHER DURING PREGNANCY, ANTEPARTUM: Status: RESOLVED | Noted: 2023-02-24 | Resolved: 2023-08-29

## 2023-08-29 LAB
B-HCG UR QL: NEGATIVE
EXPIRATION DATE: NORMAL
INTERNAL NEGATIVE CONTROL: NORMAL
INTERNAL POSITIVE CONTROL: NORMAL
Lab: NORMAL

## 2023-08-29 NOTE — PROGRESS NOTES
Procedure: Nexplanon insertion  Preoperative diagnosis: Encounter for Nexplanon insertion  Postoperative diagnosis: Same  Indications: Patient request Nexplanon for contraception.  She has been extensively counseled with the risk, benefits, alternatives, and side effects, and she wishes to proceed with Nexplanon insertion today.  Anesthesia: 1% lidocaine with epinephrine  Pathology: None  Estimated blood loss: Less than 5 mL  Office urine pregnancy test: Negative today  Complications: None  NDC: 35080-239-42  Lot #: [id number]  Exp: 5/29/2025      Office Visit on 08/29/2023   Component Date Value Ref Range Status    HCG, Urine, QL 08/29/2023 Negative  Negative Final    Lot Number 08/29/2023 [id number]   Final    Internal Positive Control 08/29/2023 Passed  Positive, Passed Final    Internal Negative Control 08/29/2023 Passed  Negative, Passed Final    Expiration Date 08/29/2023 12/14/2024   Final          Procedure in detail:     Risks, benefits, alternatives explained. All questions answered. Consents signed.  Patient placed on examined table. Nexplanon to be inserted into nondominant right arm. The area for insertion prepped with betadine in a sterile fashion. About 5 mL of 1% lidocaine with epinephrine.   The insertion site was identified approximately 6-8 cm proximal to the elbow crease in the underside of the upper arm overlying the groove between the biceps and triceps muscles. The skin at the insertion site was stretched by my thumb and index finger. Then inserted the needle tip, bevel side up, at an angle not greater than 20ø to the skin surface, just until the skin was penetrated. The applicator was then lowered so that it was parallel to the arm. To minimize the chance of deep incision, the skin was tented upwards with the tip of the needle. The needle was then gently inserted to the full length. Then fixed the device in place on the arm with one hand. I then slowly and carefully retracted the needle cannula  back along the length of the device after pushing the release button. The obturator was seen in the device to determine that the nexplanon had been released. Both the patient and I were easily able to feel the device under the skin. Steri-Strips were applied at the site, and the arm was gently wrapped with gauze. There were no complications. The patient tolerated the procedure well. She was given a reminder card. She was advised to use condoms as a backup method for at least a week after insertion.

## 2023-09-27 ENCOUNTER — TELEPHONE (OUTPATIENT)
Dept: OBSTETRICS AND GYNECOLOGY | Facility: CLINIC | Age: 33
End: 2023-09-27
Payer: COMMERCIAL

## 2023-09-27 NOTE — TELEPHONE ENCOUNTER
Spoke with patient and told patient common side effect from Nexplanon per Dr. Vance. She states she is not bleeding heavily. I talked to patient about no show for last appointment. Patient stated if this doesn't resolve or gets worse will make and appointment.

## 2023-09-27 NOTE — TELEPHONE ENCOUNTER
Pt had nexplanon inserted last month and states she has been bleeding since insertion. She wants to know if there is anything you can prescribe to stop the bleeding.    Suad

## 2023-10-18 ENCOUNTER — OFFICE VISIT (OUTPATIENT)
Dept: OBSTETRICS AND GYNECOLOGY | Facility: CLINIC | Age: 33
End: 2023-10-18
Payer: COMMERCIAL

## 2023-10-18 VITALS
HEIGHT: 62 IN | BODY MASS INDEX: 35.41 KG/M2 | SYSTOLIC BLOOD PRESSURE: 128 MMHG | DIASTOLIC BLOOD PRESSURE: 78 MMHG | WEIGHT: 192.4 LBS

## 2023-10-18 DIAGNOSIS — I10 BENIGN ESSENTIAL HYPERTENSION: ICD-10-CM

## 2023-10-18 DIAGNOSIS — Z09 POSTOPERATIVE EXAMINATION: ICD-10-CM

## 2023-10-18 DIAGNOSIS — N92.1 BREAKTHROUGH BLEEDING ON NEXPLANON: ICD-10-CM

## 2023-10-18 DIAGNOSIS — Z97.5 BREAKTHROUGH BLEEDING ON NEXPLANON: ICD-10-CM

## 2023-10-18 RX ORDER — PNV NO.95/FERROUS FUM/FOLIC AC 28MG-0.8MG
1 TABLET ORAL DAILY
Qty: 90 TABLET | Refills: 3 | Status: SHIPPED | OUTPATIENT
Start: 2023-10-18

## 2023-10-18 RX ORDER — PSEUDOEPHEDRINE HCL 30 MG
100 TABLET ORAL 2 TIMES DAILY PRN
Qty: 180 CAPSULE | Refills: 3 | Status: SHIPPED | OUTPATIENT
Start: 2023-10-18

## 2023-10-18 RX ORDER — SERTRALINE HYDROCHLORIDE 100 MG/1
100 TABLET, FILM COATED ORAL DAILY
Qty: 90 TABLET | Refills: 3 | Status: SHIPPED | OUTPATIENT
Start: 2023-10-18

## 2023-10-19 NOTE — PROGRESS NOTES
"Chief Complaint  Gynecologic Exam (Patient is here for a follow up on nexplanon and zoloft)    Subjective        Rhonda Wells presents to [facility]  History of Present Illness  Patient is here for follow-up of irregular bleeding on Nexplanon.  She is also here for follow-up of postpartum depression symptoms on Zoloft.  She reports that the breakthrough bleeding that she was previously experiencing with Nexplanon is all but resolved.  She now just has some very occasional episodes of scant bleeding.  She generally is happy with the Nexplanon at this time.    She also reports significant improvement in her postpartum depression.  She says she still occasionally has some days where she feels down, but overall she feels much better than she did at the time of the last visit.  She would like to continue on the Zoloft.    The following portions of the patient's history were reviewed and updated as appropriate: allergies, current medications, past family history, past medical history, past social history, past surgical history, and problem list.    Objective   Vital Signs:  /78   Ht 157.5 cm (62\")   Wt 87.3 kg (192 lb 6.4 oz)   BMI 35.19 kg/m²   Estimated body mass index is 35.19 kg/m² as calculated from the following:    Height as of this encounter: 157.5 cm (62\").    Weight as of this encounter: 87.3 kg (192 lb 6.4 oz).             Physical Exam   General: No acute distress, awake and oriented x3  Psychiatric: good judgment and insight, normal mood  Neurological: cranial nerves II through XII intact, no deficits    Result Review :                   Assessment and Plan   Diagnoses and all orders for this visit:    1. Postpartum depression (Primary)  -     sertraline (Zoloft) 100 MG tablet; Take 1 tablet by mouth Daily.  Dispense: 90 tablet; Refill: 3    Overall doing well.  She reports she is much better than she was at the time of her last visit.  Would like to stay on the medicine " and would like to increase the dose of possible.  Follow-up in about 4-6 weeks to see how she is doing on the new dose.  2. Breakthrough bleeding on Nexplanon - improved  Breakthrough bleeding has nearly completely resolved.  Patient says that this does not bother her anymore.  She does not require any further therapy.  3. Benign essential hypertension  Blood pressure normal today.  May discontinue use of medication.  4. Postoperative examination  -     Prenatal Vit-Fe Fumarate-FA (Prenatal Vitamin) 27-0.8 MG tablet; Take 1 tablet by mouth Daily.  Dispense: 90 tablet; Refill: 3  -     docusate sodium 100 MG capsule; Take 1 capsule by mouth 2 (Two) Times a Day As Needed (constipation).  Dispense: 180 capsule; Refill: 3    Patient requested refills of stool softener and prenatal vitamins.           Follow Up   Return in about 6 weeks (around 11/29/2023).  Patient was given instructions and counseling regarding her condition or for health maintenance advice. Please see specific information pulled into the AVS if appropriate.

## 2023-11-29 ENCOUNTER — OFFICE VISIT (OUTPATIENT)
Dept: OBSTETRICS AND GYNECOLOGY | Facility: CLINIC | Age: 33
End: 2023-11-29
Payer: COMMERCIAL

## 2023-11-29 VITALS
HEIGHT: 62 IN | BODY MASS INDEX: 35.63 KG/M2 | SYSTOLIC BLOOD PRESSURE: 124 MMHG | DIASTOLIC BLOOD PRESSURE: 80 MMHG | WEIGHT: 193.6 LBS

## 2023-11-29 DIAGNOSIS — Z30.09 FAMILY PLANNING ADVICE: ICD-10-CM

## 2023-11-29 DIAGNOSIS — Z97.5 BREAKTHROUGH BLEEDING ON NEXPLANON: ICD-10-CM

## 2023-11-29 DIAGNOSIS — R68.82 DIMINISHED LIBIDO: ICD-10-CM

## 2023-11-29 DIAGNOSIS — N92.1 BREAKTHROUGH BLEEDING ON NEXPLANON: ICD-10-CM

## 2023-11-29 DIAGNOSIS — F32.A DEPRESSIVE DISORDER: Primary | ICD-10-CM

## 2023-11-29 RX ORDER — SERTRALINE HYDROCHLORIDE 100 MG/1
100 TABLET, FILM COATED ORAL DAILY
Qty: 90 TABLET | Refills: 3 | Status: SHIPPED | OUTPATIENT
Start: 2023-11-29

## 2023-11-29 NOTE — PROGRESS NOTES
"Chief Complaint  Gynecologic Exam (Patient is here today for a follow up on ppd, taking zoloft. Having abn bleeding with nexplanon- will have a normal flow and then the blood turns to a bbq sauce type consistency )    Subjective        Rhonda GAURANG Wells presents to [facility]  History of Present Illness  Patient is here to discuss her depression symptoms on Zoloft.  At the time of the last visit we had increased the dose of Zoloft to 100 mg.  She says she is doing very well at this dose.  She reports her depression symptoms are well-controlled.  She would like to continue on the medication.  She does report noting diminished libido on this current dose, but says this has not been a major concern for her.  Patient is also reporting some irregular breakthrough bleeding with her Nexplanon.  She says she will go stretches of time with no bleeding, but then will have unpredictable bleeding that is light in volume and dark in color.  She says again, this not a major concern for her.  Patient also has questions about trying to conceive in the future.  Patient asked about her previous uterine incision and need for future  section.  She also asked about safety of future pregnancy.  She is otherwise in her usual state of health at this time.    The following portions of the patient's history were reviewed and updated as appropriate: allergies, current medications, past family history, past medical history, past social history, past surgical history, and problem list.    Objective   Vital Signs:  /80   Ht 157.5 cm (62\")   Wt 87.8 kg (193 lb 9.6 oz)   BMI 35.41 kg/m²   Estimated body mass index is 35.41 kg/m² as calculated from the following:    Height as of this encounter: 157.5 cm (62\").    Weight as of this encounter: 87.8 kg (193 lb 9.6 oz).             Physical Exam   General: No acute distress, awake and oriented x3  Psychiatric: good judgment and insight, normal " mood  Neurological: cranial nerves II through XII intact, no deficits    Result Review :                   Assessment and Plan   Diagnoses and all orders for this visit:    1. Depressive disorder (Primary)  -     sertraline (Zoloft) 100 MG tablet; Take 1 tablet by mouth Daily.  Dispense: 90 tablet; Refill: 3    Patient reports that her depression symptoms are currently very well-controlled and she would like to continue on the current dose.  We did discuss the diminished libido that she is experiencing.  This is likely a side effect of her Zoloft, possibly combination with the general life stresses of having a less than 1-year-old child.  We explained that diminished libido is well-recognized side effect of nearly all SSRIs.  Explained one of the exceptions would be wellbutrin.  We discussed possibility of trying to titrate the patient off of sertraline and starting Wellbutrin.  We also discussed possibility of decreasing the dose of sertraline to see if the diminished libido improves.  Patient declines these options at this time.  She says that overall the diminished libido is not that concerning for her and she would rather continue on her current dose of Zoloft.  I explained that I can continue to prescribe Zoloft for the patient, unless we find that her symptoms are worsening and she is needing other therapies.  At that time, I would encourage the patient to start to see a psychiatrist for management of her depression symptoms.  She verbalized understanding.    2. Breakthrough bleeding on Nexplanon  Unfortunately, breakthrough bleeding is very common side effect of Nexplanon.  Reassurance is offered to the patient.  I do not feel that any further workup or management is necessary for this.  The only alternative would be removal of Nexplanon and trying other forms of contraception.  She declines this at this time and says that overall the breakthrough bleeding is not that problematic for her.    3. Diminished  libido  As discussion above.    4. Family planning advice  Patient asked about future pregnancies.  Again as previously discussed would recommend waiting at least a year before trying to conceive again in order to try to diminish the risk of abnormal placentation, uterine scar disruption, and other adverse pregnancy outcomes.  She verbalized understanding.  Patient also asked if it is necessary to have another  section.  I again explained to the patient that  section is ABSOLUTELY NECESSARY in future pregnancies.  I discussed with the patient the previous types of incision she had.  We discussed the findings of the most recent surgery.  Patient had a prior J extension during her first  delivery.  During the time of her most recent delivery, the uterine incision extended spontaneously up the side of the uterus where the previous J extension had been performed.  I feel this patient is at very high risk of uterine rupture with a trial of labor.  I again would recommend primary  delivery at roughly 36 weeks of gestation.               Follow Up   No follow-ups on file.  Patient was given instructions and counseling regarding her condition or for health maintenance advice. Please see specific information pulled into the AVS if appropriate.

## 2024-09-11 ENCOUNTER — TELEPHONE (OUTPATIENT)
Dept: OBSTETRICS AND GYNECOLOGY | Facility: CLINIC | Age: 34
End: 2024-09-11
Payer: COMMERCIAL

## 2024-09-11 DIAGNOSIS — Z09 POSTOPERATIVE EXAMINATION: ICD-10-CM

## 2024-09-11 RX ORDER — VALACYCLOVIR HYDROCHLORIDE 500 MG/1
500 TABLET, FILM COATED ORAL 2 TIMES DAILY
Qty: 180 TABLET | Refills: 1 | Status: SHIPPED | OUTPATIENT
Start: 2024-09-11 | End: 2025-09-11

## 2024-09-11 RX ORDER — PSEUDOEPHEDRINE HCL 30 MG
100 TABLET ORAL 2 TIMES DAILY PRN
Qty: 180 CAPSULE | Refills: 1 | Status: SHIPPED | OUTPATIENT
Start: 2024-09-11

## 2024-09-11 NOTE — TELEPHONE ENCOUNTER
Caller: Rhonda Wells    Relationship: Self    Best call back number: [phone]    Requested Prescriptions: VALTREX 100MG AND DOCUSATE SODIUM 100 MG   Requested Prescriptions      No prescriptions requested or ordered in this encounter        [facility] be [id number] [place], [place] - [address] AT [facility] [address] [facility] [phone] [facility] [phone] FX     Last office visit with prescribing clinician: 11/29/2023   Last telemedicine visit with prescribing clinician: Visit date not found   Next office visit with prescribing clinician: Visit date not found     Additional details provided by patient: PATIENT STATED THAT  MG OF VALTREX MAKES HER CONSTIPATED     Does the patient have less than a 3 day supply:  [x] Yes  [] No    Would you like a call back once the refill request has been completed: [x] Yes [] No    If the office needs to give you a call back, can they leave a voicemail: [x] Yes [] No    Felecia Newman   09/11/24 10:03 EDT

## 2024-09-11 NOTE — TELEPHONE ENCOUNTER
Left message for Rhonda that Dr Vance said he needs to know if you want the  500 mg or 1,000 mg dose of Valtrex. Also, is she using the Valtrex episodically for outbreaks or daily for prevention? The person that sent the message said 100 mg of valtrex. [phone]

## 2024-09-11 NOTE — TELEPHONE ENCOUNTER
Provider: DR GOEL    Caller: AUGIE MCGUIRE    Relationship to Patient: SELF    Phone Number: [phone]    Reason for Call: PT CALLED STATED SHE HAD A MISSED CALL FROM ARSENIO ABOUT MEDICATION - SEE PREVIOUS TE FROM TODAY.     UNABLE TO WT  PLEASE CALL PT BACK.

## 2024-09-11 NOTE — TELEPHONE ENCOUNTER
Spoke with Rhonda to let her know that Dr Vance sent in Rx for stool softener and valtrex to [facility].

## 2024-11-04 DIAGNOSIS — Z09 POSTOPERATIVE EXAMINATION: ICD-10-CM

## 2024-11-04 DIAGNOSIS — O09.899 SUPERVISION OF OTHER HIGH RISK PREGNANCIES, UNSPECIFIED TRIMESTER: ICD-10-CM

## 2024-11-04 RX ORDER — ASPIRIN 81 MG/1
81 TABLET, COATED ORAL DAILY
Qty: 30 TABLET | Refills: 6 | OUTPATIENT
Start: 2024-11-04

## 2024-11-04 RX ORDER — PRENATAL VIT/IRON FUM/FOLIC AC 27MG-0.8MG
1 TABLET ORAL DAILY
Qty: 90 TABLET | Refills: 3 | Status: SHIPPED | OUTPATIENT
Start: 2024-11-04

## 2024-11-04 RX ORDER — SERTRALINE HYDROCHLORIDE 100 MG/1
100 TABLET, FILM COATED ORAL DAILY
Qty: 90 TABLET | Refills: 3 | Status: SHIPPED | OUTPATIENT
Start: 2024-11-04

## 2024-11-04 NOTE — TELEPHONE ENCOUNTER
Med refill. 11/29/23 Depressive disorder, PP depression, family planning advice, breakthrough bleeding on nexplanon. Milo. Thank you

## 2024-11-06 NOTE — TELEPHONE ENCOUNTER
Call to Rhonda and left voicemail. Dr Vance sent in refill for sertraline and prenatal vitamins,and low-dose aspirin. You should only needs a low-dose aspirin if you are pregnant. At the time of your last visit you had a Nexplanon in place. Dr Vance is fine with you taking PNV and happy to refill the sertraline. I called also to make sure that you are taking these medications. He sent in refills 2 days ago. Thank you

## 2024-12-05 ENCOUNTER — TELEPHONE (OUTPATIENT)
Dept: OBSTETRICS AND GYNECOLOGY | Facility: CLINIC | Age: 34
End: 2024-12-05

## 2024-12-05 DIAGNOSIS — B37.31 CANDIDAL VULVOVAGINITIS: Primary | ICD-10-CM

## 2024-12-05 RX ORDER — FLUCONAZOLE 150 MG/1
150 TABLET ORAL
Qty: 2 TABLET | Refills: 0 | Status: SHIPPED | OUTPATIENT
Start: 2024-12-05 | End: 2024-12-11

## 2024-12-05 NOTE — TELEPHONE ENCOUNTER
Provider: DR GOEL    Caller: Rhonda Wells A    Relationship to Patient: Self    Pharmacy: [facility]     Phone Number: [phone]    Reason for Call: PT IS HAVING SYMPTOMS OF A YEAST INFECTION (ITCHING/DISCHARGE) ITS BEEN GOING ON FOR A FEW DAYS - SHE DID TAKE MONISTAT YESTERDAY AND IS STILL HAVING SYMPTOMS PT WOULD LIKE A MEDICATION CALLED IN FOR HER IF POSSIBLE PLEASE CALL     When was the patient last seen: 11/29/23

## 2024-12-05 NOTE — TELEPHONE ENCOUNTER
Pt last seen on 11/29/23 for GYN F/U appt. Pt is having symptoms of yeast infection, itching and discharge that's been going on for a few days. No availability to schedule Pt.  Please advise  thanks

## 2024-12-05 NOTE — TELEPHONE ENCOUNTER
LVM informing Pt prescription sent to pharmacy and advising Pt to call back to get scheduled for AE